# Patient Record
Sex: MALE | Race: WHITE | Employment: FULL TIME | URBAN - METROPOLITAN AREA
[De-identification: names, ages, dates, MRNs, and addresses within clinical notes are randomized per-mention and may not be internally consistent; named-entity substitution may affect disease eponyms.]

---

## 2017-10-09 ENCOUNTER — OFFICE VISIT (OUTPATIENT)
Dept: FAMILY MEDICINE CLINIC | Age: 42
End: 2017-10-09

## 2017-10-09 VITALS
BODY MASS INDEX: 34.9 KG/M2 | SYSTOLIC BLOOD PRESSURE: 128 MMHG | OXYGEN SATURATION: 98 % | DIASTOLIC BLOOD PRESSURE: 70 MMHG | HEART RATE: 78 BPM | WEIGHT: 216.2 LBS

## 2017-10-09 DIAGNOSIS — S80.12XA CONTUSION OF KNEE AND LOWER LEG, LEFT, INITIAL ENCOUNTER: Primary | ICD-10-CM

## 2017-10-09 DIAGNOSIS — M25.462 EFFUSION OF LEFT KNEE: ICD-10-CM

## 2017-10-09 DIAGNOSIS — S80.02XA CONTUSION OF KNEE AND LOWER LEG, LEFT, INITIAL ENCOUNTER: Primary | ICD-10-CM

## 2017-10-09 PROCEDURE — 99213 OFFICE O/P EST LOW 20 MIN: CPT | Performed by: FAMILY MEDICINE

## 2017-10-09 RX ORDER — ETODOLAC 400 MG/1
400 TABLET, FILM COATED ORAL 2 TIMES DAILY
Qty: 30 TABLET | Refills: 1 | Status: SHIPPED | OUTPATIENT
Start: 2017-10-09 | End: 2021-08-18

## 2017-10-09 ASSESSMENT — PATIENT HEALTH QUESTIONNAIRE - PHQ9
2. FEELING DOWN, DEPRESSED OR HOPELESS: 1
SUM OF ALL RESPONSES TO PHQ QUESTIONS 1-9: 1
1. LITTLE INTEREST OR PLEASURE IN DOING THINGS: 0
SUM OF ALL RESPONSES TO PHQ9 QUESTIONS 1 & 2: 1

## 2017-10-09 NOTE — PATIENT INSTRUCTIONS
[email or web address] and sign in to your [email or web address] account. Enter G230 in the KRAFT[place]ERK[place] box to learn more about \"Meniscus Tear: Care Instructions. \"     If you do not have an account, please click on the \"Sign Up Now\" link. Current as of: March 21, 2017  Content Version: 11.3  © 7067-2057 [employer]. Care instructions adapted under license by [facility] ([facility]). If you have questions about a medical condition or this instruction, always ask your healthcare professional. [address] any warranty or liability for your use of this information. Patient Education        Meniscus Tear: Exercises  Your Care Instructions  Here are some examples of typical rehabilitation exercises for your condition. Start each exercise slowly. Ease off the exercise if you start to have pain. Your doctor or physical therapist will tell you when you can start these exercises and which ones will work best for you. How to do the exercises  Calf wall stretch    1. Stand facing a wall with your hands on the wall at about eye level. Put your affected leg about a step behind your other leg. 2. Keeping your back leg straight and your back heel on the floor, bend your front knee and gently bring your hip and chest toward the wall until you feel a stretch in the calf of your back leg. 3. Hold the stretch for at least 15 to 30 seconds. 4. Repeat 2 to 4 times. 5. Repeat steps 1 through 4, but this time keep your back knee bent. Hamstring wall stretch    1. Lie on your back in a doorway, with your good leg through the open door. 2. Slide your affected leg up the wall to straighten your knee. You should feel a gentle stretch down the back of your leg. ¨ Do not arch your back. ¨ Do not bend either knee. ¨ Keep one heel touching the floor and the other heel touching the wall. Do not point your toes. 3. Hold the stretch for at least 1 minute.  Then over time, try to lengthen the time you hold the stretch to as long as 6 minutes. 4. Repeat 2 to 4 times. If you do not have a place to do this exercise in a doorway, there is another way to do it:  1. Lie on your back, and bend your affected leg. 2. Loop a towel under the ball and toes of that foot, and hold the ends of the towel in your hands. 3. Straighten your knee, and slowly pull back on the towel. You should feel a gentle stretch down the back of your leg. 4. Hold the stretch for at least 15 to 30 seconds. Or even better, hold the stretch for 1 minute if you can. 5. Repeat 2 to 4 times. Quad sets    1. Sit with your leg straight and supported on the floor or a firm bed. (If you feel discomfort in the front or back of your knee, place a small towel roll under your knee.)  2. Tighten the muscles on top of your thigh by pressing the back of your knee flat down to the floor. (If you feel discomfort under your kneecap, place a small towel roll under your knee.)  3. Hold for about 6 seconds, then rest up to 10 seconds. 4. Do 8 to 12 repetitions several times a day. Straight-leg raises to the front    1. Lie on your back with your good knee bent so that your foot rests flat on the floor. Your injured leg should be straight. Make sure that your low back has a normal curve. You should be able to slip your flat hand in between the floor and the small of your back, with your palm touching the floor and your back touching the back of your hand. 2. Tighten the thigh muscles in the injured leg by pressing the back of your knee flat down to the floor. Hold your knee straight. 3. Keeping the thigh muscles tight, lift your injured leg up so that your heel is about 12 inches off the floor. Hold for 5 seconds and then lower slowly. 4. Do 8 to 12 repetitions. Straight-leg raises to the back    1. Lie on your stomach, and lift your leg straight up behind you (toward the ceiling).   2. Lift your toes about 6 inches off the floor, hold for about 6 seconds, then lower slowly. 3. Do 8 to 12 repetitions. Hamstring curls    1. Lie on your stomach with your knees straight. If your kneecap is uncomfortable, roll up a washcloth and put it under your leg just above your kneecap. 2. Lift the foot of your injured leg by bending the knee so that you bring the foot up toward your buttock. If this motion hurts, try it without bending your knee quite as far. This may help you avoid any painful motion. 3. Slowly lower your leg back to the floor. 4. Do 8 to 12 repetitions. 5. With permission from your doctor or physical therapist, you may also want to add a cuff weight to your ankle (not more than 5 pounds). With weight, you do not have to lift your leg more than 12 inches to get a hamstring workout. Wall slide with ball squeeze    1. Stand with your back against a wall and with your feet about shoulder-width apart. Your feet should be about 12 inches away from the wall. 2. Put a ball about the size of a soccer ball between your knees. Then slowly slide down the wall until your knees are bent about 20 to 30 degrees. 3. Tighten your thigh muscles by squeezing the ball between your knees. Hold that position for about 10 seconds, then stop squeezing. Rest for up to 10 seconds between repetitions. 4. Repeat 8 to 12 times. Heel raises    1. Stand with your feet a few inches apart, with your hands lightly resting on a counter or chair in front of you. 2. Slowly raise your heels off the floor while keeping your knees straight. 3. Hold for about 6 seconds, then slowly lower your heels to the floor. 4. Do 8 to 12 repetitions several times during the day. Heel dig bridging    Note: Stop doing this exercise if it causes pain. 1. Lie on your back with both knees bent and your ankles bent so that only your heels are digging into the floor. Your knees should be bent about 90 degrees.   2. Then push your heels into the floor, squeeze your buttocks, and lift your hips off the floor until your shoulders, hips, and knees are all in a straight line. 3. Hold for about 6 seconds as you continue to breathe normally, and then slowly lower your hips back down to the floor and rest for up to 10 seconds. 4. Do 8 to 12 repetitions. Shallow standing knee bends    Note: Do this exercise only if you have very little pain; if you have no clicking, locking, or giving way in the injured knee; and if it does not hurt while you are doing 8 to 12 repetitions. 1. Stand with your hands lightly resting on a counter or chair in front of you. Put your feet shoulder-width apart. 2. Slowly bend your knees so that you squat down like you are going to sit in a chair. Make sure your knees do not go in front of your toes. 3. Lower yourself about 6 inches. Your heels should remain on the floor at all times. 4. Rise slowly to a standing position. Follow-up care is a key part of your treatment and safety. Be sure to make and go to all appointments, and call your doctor if you are having problems. It's also a good idea to know your test results and keep a list of the medicines you take. Where can you learn more? Go to [email or web address].[employer]. org and sign in to your [employer] account. Enter C183 in the Telera box to learn more about \"Meniscus Tear: Exercises. \"     If you do not have an account, please click on the \"Sign Up Now\" link. Current as of: March 21, 2017  Content Version: 11.3  © 2167-4040 [employer]. Care instructions adapted under license by [facility] ([facility]). If you have questions about a medical condition or this instruction, always ask your healthcare professional. [place] [address] any warranty or liability for your use of this information.
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
## 2017-10-22 ASSESSMENT — ENCOUNTER SYMPTOMS
COUGH: 0
ABDOMINAL PAIN: 0
BLURRED VISION: 0

## 2018-06-07 ENCOUNTER — OFFICE VISIT (OUTPATIENT)
Dept: FAMILY MEDICINE CLINIC | Age: 43
End: 2018-06-07

## 2018-06-07 VITALS
DIASTOLIC BLOOD PRESSURE: 82 MMHG | WEIGHT: 204.2 LBS | SYSTOLIC BLOOD PRESSURE: 132 MMHG | OXYGEN SATURATION: 97 % | BODY MASS INDEX: 32.96 KG/M2 | HEART RATE: 94 BPM

## 2018-06-07 DIAGNOSIS — F41.9 ANXIETY: Primary | ICD-10-CM

## 2018-06-07 DIAGNOSIS — F32.A DEPRESSION, UNSPECIFIED DEPRESSION TYPE: ICD-10-CM

## 2018-06-07 DIAGNOSIS — F51.01 PRIMARY INSOMNIA: ICD-10-CM

## 2018-06-07 PROCEDURE — 99213 OFFICE O/P EST LOW 20 MIN: CPT | Performed by: FAMILY MEDICINE

## 2018-06-07 RX ORDER — QUETIAPINE FUMARATE 50 MG/1
50 TABLET, FILM COATED ORAL 2 TIMES DAILY
Qty: 60 TABLET | Refills: 1 | Status: SHIPPED | OUTPATIENT
Start: 2018-06-07 | End: 2019-01-17

## 2018-06-24 ASSESSMENT — ENCOUNTER SYMPTOMS
COUGH: 0
BLURRED VISION: 0
ABDOMINAL PAIN: 0

## 2019-01-15 ENCOUNTER — OFFICE VISIT (OUTPATIENT)
Dept: FAMILY MEDICINE CLINIC | Age: 44
End: 2019-01-15
Payer: COMMERCIAL

## 2019-01-15 VITALS
DIASTOLIC BLOOD PRESSURE: 70 MMHG | RESPIRATION RATE: 16 BRPM | SYSTOLIC BLOOD PRESSURE: 124 MMHG | WEIGHT: 208 LBS | OXYGEN SATURATION: 99 % | TEMPERATURE: 97.5 F | HEART RATE: 82 BPM | BODY MASS INDEX: 33.43 KG/M2 | HEIGHT: 66 IN

## 2019-01-15 DIAGNOSIS — J40 BRONCHITIS: Primary | ICD-10-CM

## 2019-01-15 DIAGNOSIS — F17.200 CURRENT SMOKER: ICD-10-CM

## 2019-01-15 PROCEDURE — 99213 OFFICE O/P EST LOW 20 MIN: CPT | Performed by: FAMILY MEDICINE

## 2019-01-15 RX ORDER — AZITHROMYCIN 250 MG/1
TABLET, FILM COATED ORAL
Qty: 6 TABLET | Refills: 0 | Status: SHIPPED | OUTPATIENT
Start: 2019-01-15 | End: 2019-01-25

## 2019-01-15 RX ORDER — DEXTROMETHORPHAN HYDROBROMIDE AND PROMETHAZINE HYDROCHLORIDE 15; 6.25 MG/5ML; MG/5ML
5 SYRUP ORAL NIGHTLY PRN
Qty: 50 ML | Refills: 0 | Status: SHIPPED | OUTPATIENT
Start: 2019-01-15 | End: 2019-01-17

## 2019-01-15 RX ORDER — BENZONATATE 100 MG/1
CAPSULE ORAL
Qty: 30 CAPSULE | Refills: 0 | Status: SHIPPED | OUTPATIENT
Start: 2019-01-15 | End: 2020-12-21

## 2019-01-17 ENCOUNTER — TELEPHONE (OUTPATIENT)
Dept: FAMILY MEDICINE CLINIC | Age: 44
End: 2019-01-17

## 2019-01-17 ENCOUNTER — OFFICE VISIT (OUTPATIENT)
Dept: FAMILY MEDICINE CLINIC | Age: 44
End: 2019-01-17
Payer: COMMERCIAL

## 2019-01-17 VITALS
TEMPERATURE: 98.7 F | OXYGEN SATURATION: 94 % | BODY MASS INDEX: 32.1 KG/M2 | DIASTOLIC BLOOD PRESSURE: 70 MMHG | HEART RATE: 100 BPM | HEIGHT: 68 IN | SYSTOLIC BLOOD PRESSURE: 100 MMHG | WEIGHT: 211.8 LBS

## 2019-01-17 DIAGNOSIS — J40 BRONCHITIS: Primary | ICD-10-CM

## 2019-01-17 PROCEDURE — 99213 OFFICE O/P EST LOW 20 MIN: CPT | Performed by: NURSE PRACTITIONER

## 2019-01-17 RX ORDER — PREDNISONE 20 MG/1
40 TABLET ORAL DAILY
Qty: 10 TABLET | Refills: 0 | Status: SHIPPED | OUTPATIENT
Start: 2019-01-17 | End: 2019-01-22

## 2019-01-17 RX ORDER — GUAIFENESIN 600 MG/1
600 TABLET, EXTENDED RELEASE ORAL 2 TIMES DAILY
Qty: 30 TABLET | Refills: 0 | Status: SHIPPED | OUTPATIENT
Start: 2019-01-17 | End: 2020-12-21

## 2019-01-17 ASSESSMENT — ENCOUNTER SYMPTOMS
SORE THROAT: 1
SINUS PRESSURE: 1
DIARRHEA: 0
SINUS PAIN: 1
RHINORRHEA: 1
CHEST TIGHTNESS: 1
WHEEZING: 0
COUGH: 1
NAUSEA: 0
VOMITING: 0
HEMOPTYSIS: 0
SHORTNESS OF BREATH: 0

## 2019-02-08 ENCOUNTER — OFFICE VISIT (OUTPATIENT)
Dept: FAMILY MEDICINE CLINIC | Age: 44
End: 2019-02-08
Payer: COMMERCIAL

## 2019-02-08 ENCOUNTER — TELEPHONE (OUTPATIENT)
Dept: FAMILY MEDICINE CLINIC | Age: 44
End: 2019-02-08

## 2019-02-08 ENCOUNTER — HOSPITAL ENCOUNTER (OUTPATIENT)
Dept: GENERAL RADIOLOGY | Age: 44
End: 2019-02-08
Payer: COMMERCIAL

## 2019-02-08 ENCOUNTER — HOSPITAL ENCOUNTER (OUTPATIENT)
Dept: GENERAL RADIOLOGY | Age: 44
Discharge: HOME | End: 2019-02-08
Payer: COMMERCIAL

## 2019-02-08 ENCOUNTER — HOSPITAL ENCOUNTER (OUTPATIENT)
Age: 44
Discharge: HOME | End: 2019-02-08
Payer: COMMERCIAL

## 2019-02-08 VITALS
HEART RATE: 93 BPM | OXYGEN SATURATION: 89 % | SYSTOLIC BLOOD PRESSURE: 120 MMHG | TEMPERATURE: 99.3 F | BODY MASS INDEX: 31.85 KG/M2 | WEIGHT: 206.4 LBS | DIASTOLIC BLOOD PRESSURE: 72 MMHG

## 2019-02-08 DIAGNOSIS — R09.02 HYPOXIA: ICD-10-CM

## 2019-02-08 DIAGNOSIS — R05.9 COUGH: Primary | ICD-10-CM

## 2019-02-08 DIAGNOSIS — R05.9 COUGH: ICD-10-CM

## 2019-02-08 DIAGNOSIS — R53.83 OTHER FATIGUE: ICD-10-CM

## 2019-02-08 DIAGNOSIS — J20.8 ACUTE BRONCHITIS DUE TO OTHER SPECIFIED ORGANISMS: ICD-10-CM

## 2019-02-08 LAB
ALBUMIN SERPL-MCNC: 4.4 GM/DL (ref 3.4–5)
ALP BLD-CCNC: 78 IU/L (ref 40–128)
ALT SERPL-CCNC: 50 U/L (ref 10–40)
ANION GAP SERPL CALCULATED.3IONS-SCNC: 10 MMOL/L (ref 4–16)
AST SERPL-CCNC: 52 IU/L (ref 15–37)
ATYPICAL LYMPHOCYTE ABSOLUTE COUNT: ABNORMAL
BASOPHILS ABSOLUTE: 0 K/CU MM
BASOPHILS RELATIVE PERCENT: 0.7 % (ref 0–1)
BILIRUB SERPL-MCNC: 0.3 MG/DL (ref 0–1)
BUN BLDV-MCNC: 8 MG/DL (ref 6–23)
CALCIUM SERPL-MCNC: 8.9 MG/DL (ref 8.3–10.6)
CHLORIDE BLD-SCNC: 96 MMOL/L (ref 99–110)
CHOLESTEROL: 121 MG/DL
CO2: 31 MMOL/L (ref 21–32)
CREAT SERPL-MCNC: 0.8 MG/DL (ref 0.9–1.3)
DIFFERENTIAL TYPE: ABNORMAL
EOSINOPHILS ABSOLUTE: 0 K/CU MM
EOSINOPHILS RELATIVE PERCENT: 0.4 % (ref 0–3)
GFR AFRICAN AMERICAN: >60 ML/MIN/1.73M2
GFR NON-AFRICAN AMERICAN: >60 ML/MIN/1.73M2
GLUCOSE FASTING: 76 MG/DL (ref 70–99)
HBA1C MFR BLD: 5.6 %
HCT VFR BLD CALC: 50.8 % (ref 42–52)
HDLC SERPL-MCNC: 59 MG/DL
HEMOGLOBIN: 16.8 GM/DL (ref 13.5–18)
IMMATURE NEUTROPHIL %: 0.4 % (ref 0–0.43)
LDL CHOLESTEROL DIRECT: 51 MG/DL
LYMPHOCYTES ABSOLUTE: 1.2 K/CU MM
LYMPHOCYTES RELATIVE PERCENT: 44.9 % (ref 24–44)
MCH RBC QN AUTO: 32.2 PG (ref 27–31)
MCHC RBC AUTO-ENTMCNC: 33.1 % (ref 32–36)
MCV RBC AUTO: 97.3 FL (ref 78–100)
MONOCYTES ABSOLUTE: 0.3 K/CU MM
MONOCYTES RELATIVE PERCENT: 11.6 % (ref 0–4)
PDW BLD-RTO: 12.2 % (ref 11.7–14.9)
PLATELET # BLD: 169 K/CU MM (ref 140–440)
PLT MORPHOLOGY: ABNORMAL
PMV BLD AUTO: 10.2 FL (ref 7.5–11.1)
POTASSIUM SERPL-SCNC: 4.2 MMOL/L (ref 3.5–5.1)
RBC # BLD: 5.22 M/CU MM (ref 4.6–6.2)
RBC # BLD: ABNORMAL 10*6/UL
SEGMENTED NEUTROPHILS ABSOLUTE COUNT: 1.1 K/CU MM
SEGMENTED NEUTROPHILS RELATIVE PERCENT: 42 % (ref 36–66)
SODIUM BLD-SCNC: 137 MMOL/L (ref 135–145)
T4 FREE: 1.2 NG/DL (ref 0.9–1.8)
TOTAL IMMATURE NEUTOROPHIL: 0.01 K/CU MM
TOTAL PROTEIN: 6.8 GM/DL (ref 6.4–8.2)
TRIGL SERPL-MCNC: 81 MG/DL
TSH HIGH SENSITIVITY: 1.36 UIU/ML (ref 0.27–4.2)
WBC # BLD: 2.7 K/CU MM (ref 4–10.5)

## 2019-02-08 PROCEDURE — 84443 ASSAY THYROID STIM HORMONE: CPT

## 2019-02-08 PROCEDURE — 83036 HEMOGLOBIN GLYCOSYLATED A1C: CPT | Performed by: FAMILY MEDICINE

## 2019-02-08 PROCEDURE — 83721 ASSAY OF BLOOD LIPOPROTEIN: CPT

## 2019-02-08 PROCEDURE — 71046 X-RAY EXAM CHEST 2 VIEWS: CPT

## 2019-02-08 PROCEDURE — 85025 COMPLETE CBC W/AUTO DIFF WBC: CPT

## 2019-02-08 PROCEDURE — 80061 LIPID PANEL: CPT

## 2019-02-08 PROCEDURE — 80053 COMPREHEN METABOLIC PANEL: CPT

## 2019-02-08 PROCEDURE — 36415 COLL VENOUS BLD VENIPUNCTURE: CPT

## 2019-02-08 PROCEDURE — 84439 ASSAY OF FREE THYROXINE: CPT

## 2019-02-08 PROCEDURE — 99214 OFFICE O/P EST MOD 30 MIN: CPT | Performed by: FAMILY MEDICINE

## 2019-02-08 RX ORDER — LEVOFLOXACIN 500 MG/1
500 TABLET, FILM COATED ORAL DAILY
Qty: 5 TABLET | Refills: 0 | Status: SHIPPED | OUTPATIENT
Start: 2019-02-08 | End: 2019-02-15

## 2019-02-08 RX ORDER — METHYLPREDNISOLONE 4 MG/1
TABLET ORAL
Qty: 1 KIT | Refills: 0 | Status: SHIPPED | OUTPATIENT
Start: 2019-02-08 | End: 2020-12-21

## 2019-02-08 ASSESSMENT — ENCOUNTER SYMPTOMS: SHORTNESS OF BREATH: 1

## 2019-02-09 ENCOUNTER — TELEPHONE (OUTPATIENT)
Dept: FAMILY MEDICINE CLINIC | Age: 44
End: 2019-02-09

## 2019-02-09 ASSESSMENT — ENCOUNTER SYMPTOMS
SINUS PRESSURE: 1
SINUS PAIN: 1
DIARRHEA: 0
ABDOMINAL PAIN: 0
RHINORRHEA: 0
VOMITING: 0
NAUSEA: 0
CONSTIPATION: 0

## 2020-12-21 ENCOUNTER — OFFICE VISIT (OUTPATIENT)
Dept: FAMILY MEDICINE CLINIC | Age: 45
End: 2020-12-21
Payer: COMMERCIAL

## 2020-12-21 ENCOUNTER — HOSPITAL ENCOUNTER (OUTPATIENT)
Dept: GENERAL RADIOLOGY | Age: 45
Discharge: HOME | End: 2020-12-21
Payer: COMMERCIAL

## 2020-12-21 ENCOUNTER — HOSPITAL ENCOUNTER (OUTPATIENT)
Age: 45
Discharge: HOME | End: 2020-12-21
Payer: COMMERCIAL

## 2020-12-21 VITALS
OXYGEN SATURATION: 96 % | BODY MASS INDEX: 31.39 KG/M2 | SYSTOLIC BLOOD PRESSURE: 125 MMHG | HEART RATE: 87 BPM | HEIGHT: 67 IN | WEIGHT: 200 LBS | DIASTOLIC BLOOD PRESSURE: 80 MMHG

## 2020-12-21 DIAGNOSIS — F10.20 ALCOHOLIC (HCC): ICD-10-CM

## 2020-12-21 DIAGNOSIS — R10.32 LEFT GROIN PAIN: Primary | ICD-10-CM

## 2020-12-21 DIAGNOSIS — F17.200 TOBACCO DEPENDENCE: ICD-10-CM

## 2020-12-21 PROCEDURE — 99214 OFFICE O/P EST MOD 30 MIN: CPT | Performed by: FAMILY MEDICINE

## 2020-12-21 PROCEDURE — 73502 X-RAY EXAM HIP UNI 2-3 VIEWS: CPT

## 2020-12-21 ASSESSMENT — PATIENT HEALTH QUESTIONNAIRE - PHQ9
1. LITTLE INTEREST OR PLEASURE IN DOING THINGS: 0
SUM OF ALL RESPONSES TO PHQ QUESTIONS 1-9: 0
SUM OF ALL RESPONSES TO PHQ QUESTIONS 1-9: 0

## 2020-12-21 NOTE — Clinical Note
Can you call him? The next step if he still has groin pain is to see general surgery, we can try Dr. Skip Blake up here if he would like.   Let me know and I will generate referral

## 2020-12-21 NOTE — PROGRESS NOTES
Chief Complaint   Patient presents with    Joint Pain     Patient states having pain at the groin area on the left side. Patient also states at time the pain will go down his left leg and into his hip    Nicotine Dependence     current heavy smoker       [place] NARRATIVE:    As above, not seen here in over a year, hx of recurrent LR illnesses. No injury or fall, sudden onset of left groin pain. Worse with caffeine and sitting, better with moving around a bit. Drinks hard liquor daily, and when he drinks at night the pain goes away. Otherwise constant. H/o severe groin strain as youth and also admits to years of heavy alcohol use, but presently trying to cut back from multiple shots of hard liqour almost every day for stress and depression. Also reports foot pain at night, from standing on hard floors all day. Patient is established unless otherwise noted. Above chief complaint and [place] obtained by this physician provider. Review of Systems   Constitutional: Negative for activity change, chills, fatigue and fever. HENT: Negative for congestion. Respiratory: Negative for cough, chest tightness, shortness of breath and wheezing. Cardiovascular: Negative for chest pain and leg swelling. Gastrointestinal: Negative for abdominal pain. Musculoskeletal: Positive for arthralgias (left groin pain and bilateral foot pain at night) and gait problem. Negative for back pain and myalgias. Skin: Negative for rash. Psychiatric/Behavioral: Negative for behavioral problems and dysphoric mood. No Known Allergies  Allergy historyupdated. No outpatient medications have been marked as taking for the 12/21/20 encounter (Office Visit) with Larry Kendall MD.       Tobacco use history updated.    Social History     Tobacco Use   Smoking Status Current Every Day Smoker    Packs/day: 1.00    Years: 25.00    Pack years: 25.00    Types: Cigarettes   Smokeless Tobacco Never Used        Nursing note reviewed. Vitals:    12/21/20 1332   BP: 125/80   Site: Left Upper Arm   Position: Sitting   Cuff Size: Large Adult   Pulse: 87   SpO2: 96%   Weight: 200 lb (90.7 kg)   Height: 5' 7\" (1.702 m)          BP Readings from Last 3 Encounters:   12/21/20 125/80   02/08/19 120/72   01/17/19 100/70     Wt Readings from Last 3 Encounters:   12/21/20 200 lb (90.7 kg)   02/08/19 206 lb 6.4 oz (93.6 kg)   01/17/19 211 lb 12.8 oz (96.1 kg)     Body mass index is 31.32 kg/m². No results found for this visit on 12/21/20. Physical Exam  Vitals signs and nursing note reviewed. Constitutional:       General: He is not in acute distress. Appearance: He is well-developed. He is not diaphoretic. HENT:      Head: Normocephalic and atraumatic. Eyes:      Conjunctiva/sclera: Conjunctivae normal.      Pupils: Pupils are equal, round, and reactive to light. Cardiovascular:      Rate and Rhythm: Normal rate and regular rhythm. Heart sounds: Normal heart sounds. No murmur. No friction rub. Pulmonary:      Effort: Pulmonary effort is normal. No respiratory distress. Breath sounds: Normal breath sounds. No wheezing. Abdominal:      General: Abdomen is flat. Tenderness: There is abdominal tenderness (to anterior left hip and groin, with palpation). Musculoskeletal:         General: Tenderness present. Comments: Somewhat limited hip internal and external rotation. Hop flexion is fair. Skin:     General: Skin is warm and dry. Neurological:      Mental Status: He is alert and oriented to person, place, and time. _________________________________________________  Assessment:     Ariel Ramirez was seen today for joint pain and nicotine dependence. Diagnoses and all orders for this visit:    Left groin pain  -     XR HIP 2-3 VW W PELVIS LEFT; Future    Tobacco dependence    Alcoholic ([address]      Problems listed above are stable except as follows: we will start with plain films of hip.   I am worried about arthritis vs femoral head issues given alcohol history. He is supported in his intent to reduce alcohol use, gradually is ok. He should pay careful attention to healthy nutrition. Also reduced smoking would be good. Therapeutic plan is unchanged unless otherwise specified. See orders above and comments below for details of workup or medication orders. _________________________________________________  Plan:     TO gen surg if hip xray neg, to ortho if hip xray abnormal.      Return if symptoms worsen or fail to improve.       Electronically signed by Frederick Pérez MD on 12/21/20 at 2:02 PM EST

## 2020-12-22 ENCOUNTER — TELEPHONE (OUTPATIENT)
Dept: FAMILY MEDICINE CLINIC | Age: 45
End: 2020-12-22

## 2020-12-23 ENCOUNTER — TELEPHONE (OUTPATIENT)
Dept: FAMILY MEDICINE CLINIC | Age: 45
End: 2020-12-23

## 2020-12-23 RX ORDER — CYCLOBENZAPRINE HCL 10 MG
10 TABLET ORAL NIGHTLY PRN
Qty: 30 TABLET | Refills: 0 | Status: SHIPPED | OUTPATIENT
Start: 2020-12-23 | End: 2021-01-02

## 2020-12-23 RX ORDER — PREDNISONE 20 MG/1
20 TABLET ORAL 2 TIMES DAILY
Qty: 10 TABLET | Refills: 0 | Status: SHIPPED | OUTPATIENT
Start: 2020-12-23 | End: 2020-12-28

## 2020-12-23 NOTE — TELEPHONE ENCOUNTER
So I sent a round of prednisone and also a muscle relaxant to [facility] in [place] and [facility]. He should try those medications and limit activity that is very painful. Give it maybe two weeks to see if having improvement. If not we should have a phone or video f-u because he might need to see ortho to see if hip sprain or maybe some PT or even consider a nerve test to see if has nerve injury.

## 2020-12-23 NOTE — TELEPHONE ENCOUNTER
----- Message from Love Houser MA sent at 12/22/2020  4:24 PM EST -----  Patient advised. Patient states that since the XRay is normal, wants to know what is the next step.

## 2021-01-03 ASSESSMENT — ENCOUNTER SYMPTOMS
CHEST TIGHTNESS: 0
ABDOMINAL PAIN: 0
SHORTNESS OF BREATH: 0
BACK PAIN: 0
COUGH: 0
WHEEZING: 0

## 2021-01-04 NOTE — PROGRESS NOTES
Spoke with pt who states he is going to give it a week and see how he feels and call back on Friday.

## 2021-01-21 ENCOUNTER — TELEPHONE (OUTPATIENT)
Dept: FAMILY MEDICINE CLINIC | Age: 46
End: 2021-01-21

## 2021-01-21 NOTE — TELEPHONE ENCOUNTER
----- Message from Branden Hays MD sent at 1/3/2021 12:21 PM EST -----  Can you call him? The next step if he still has groin pain is to see general surgery, we can try Dr. Ramos Comment up here if he would like.   Let me know and I will generate referral

## 2021-08-18 ENCOUNTER — OFFICE VISIT (OUTPATIENT)
Dept: FAMILY MEDICINE CLINIC | Age: 46
End: 2021-08-18
Payer: COMMERCIAL

## 2021-08-18 VITALS
OXYGEN SATURATION: 94 % | SYSTOLIC BLOOD PRESSURE: 138 MMHG | DIASTOLIC BLOOD PRESSURE: 90 MMHG | WEIGHT: 195 LBS | BODY MASS INDEX: 30.54 KG/M2 | HEART RATE: 87 BPM

## 2021-08-18 DIAGNOSIS — M77.42 METATARSALGIA OF LEFT FOOT: ICD-10-CM

## 2021-08-18 DIAGNOSIS — R10.13 EPIGASTRIC CRAMPING: ICD-10-CM

## 2021-08-18 DIAGNOSIS — R06.02 SHORTNESS OF BREATH: ICD-10-CM

## 2021-08-18 DIAGNOSIS — R13.10 DYSPHAGIA, UNSPECIFIED TYPE: Primary | ICD-10-CM

## 2021-08-18 DIAGNOSIS — R07.89 ATYPICAL CHEST PAIN: ICD-10-CM

## 2021-08-18 DIAGNOSIS — F10.20 ALCOHOL USE DISORDER, MODERATE, DEPENDENCE (HCC): ICD-10-CM

## 2021-08-18 PROCEDURE — 99214 OFFICE O/P EST MOD 30 MIN: CPT | Performed by: FAMILY MEDICINE

## 2021-08-18 RX ORDER — OMEPRAZOLE 40 MG/1
40 CAPSULE, DELAYED RELEASE ORAL
Qty: 30 CAPSULE | Refills: 5 | Status: SHIPPED | OUTPATIENT
Start: 2021-08-18 | End: 2022-07-18

## 2021-08-18 ASSESSMENT — PATIENT HEALTH QUESTIONNAIRE - PHQ9
SUM OF ALL RESPONSES TO PHQ QUESTIONS 1-9: 0
2. FEELING DOWN, DEPRESSED OR HOPELESS: 0
SUM OF ALL RESPONSES TO PHQ QUESTIONS 1-9: 0
SUM OF ALL RESPONSES TO PHQ QUESTIONS 1-9: 0
1. LITTLE INTEREST OR PLEASURE IN DOING THINGS: 0
SUM OF ALL RESPONSES TO PHQ9 QUESTIONS 1 & 2: 0

## 2021-08-18 NOTE — PROGRESS NOTES
Chief Complaint   Patient presents with    Chest Pain     pt reports discomfort in chest and trouble swallowing. pt thinks he is having episodes of apnea while sleeping     Dysphagia     Swallowing difficulty as above    Hypertension     Elevated blood pressure without diagnosis of hypertension    Nicotine Dependence     Current smoker    Abdominal Pain     epigastric discomfort like a rolling sensation in upper abdomen but then short of breath (lkike stops breathing for a second)--and anxious,        [place] NARRATIVE:    For several days has felt like something stuck in the back of his throat , just above right collarbone. No actual choking. Sx have come and fgone but are actually better today. He also reports some burning in upper chest with coffee. Is taking ibuprofen 3-4 daily for foot pain. He reports a rolling sensation not quite pain in his epigastrium. No nausea or vomiting. He does have anxiety at times however admits to drinking a fifth of liquor every 2-3 days. He also smokes a pack of cigarettes every day. Blood pressure is elevated as above. Weight is down very slightly. He admits to stress and is currently laid off. He is seeing a podiatrist for pain on the bottom of his foot. He actually has a callus under his fourth metatarsal head consistent with metatarsalgia. He already has shoe inserts and is working with a podiatrist to reduce that pain but must decrease his NSAID use as is related to current symptomatology. Patient is established unless otherwise noted. Above chief complaint and [place] obtained by this physician provider. Review of Systems   Constitutional: Negative for activity change, chills, fatigue and fever. HENT: Negative for congestion. Patient reporting neck pain above the level of the collarbones worse on the right   Respiratory: Negative for cough, chest tightness, shortness of breath and wheezing.          Patient reporting burning in upper chest when drinking coffee   Cardiovascular: Negative for chest pain and leg swelling. Gastrointestinal: Positive for abdominal pain (Patient reporting epigastric pain with a rolling sensation, without nausea or vomiting but with anxiety). Musculoskeletal: Negative for back pain and myalgias. Skin: Negative for rash. Psychiatric/Behavioral: Negative for behavioral problems and dysphoric mood. No Known Allergies  Allergy historyupdated. Outpatient Medications Marked as Taking for the 8/18/21 encounter (Office Visit) with Mathew Zelaya MD   Medication Sig Dispense Refill    omeprazole (PRILOSEC) 40 MG delayed release capsule Take 1 capsule by mouth every morning (before breakfast) 30 capsule 5       Tobacco use history updated. Social History     Tobacco Use   Smoking Status Current Every Day Smoker    Packs/day: 1.00    Years: 25.00    Pack years: 25.00    Types: Cigarettes   Smokeless Tobacco Never Used        Nursing note reviewed. Vitals:    08/18/21 1311   BP: (!) 138/90   Site: Left Upper Arm   Position: Sitting   Cuff Size: Medium Adult   Pulse: 87   SpO2: 94%   Weight: 195 lb (88.5 kg)          BP Readings from Last 3 Encounters:   08/18/21 (!) 138/90   12/21/20 125/80   02/08/19 120/72     Wt Readings from Last 3 Encounters:   08/18/21 195 lb (88.5 kg)   12/21/20 200 lb (90.7 kg)   02/08/19 206 lb 6.4 oz (93.6 kg)     Body mass index is 30.54 kg/m². No results found for this visit on 08/18/21. Physical Exam  Vitals and nursing note reviewed. Constitutional:       General: He is not in acute distress. Appearance: He is well-developed. He is not diaphoretic. HENT:      Head: Normocephalic and atraumatic. Right Ear: External ear normal.      Left Ear: External ear normal.      Nose: Nose normal.      Mouth/Throat:      Pharynx: No oropharyngeal exudate. Eyes:      General:         Right eye: No discharge. Left eye: No discharge.       Conjunctiva/sclera: Conjunctivae normal.      Pupils: Pupils are equal, round, and reactive to light. Neck:      Thyroid: No thyromegaly. Cardiovascular:      Rate and Rhythm: Normal rate and regular rhythm. Heart sounds: Normal heart sounds. No murmur heard. No gallop. Pulmonary:      Effort: Pulmonary effort is normal. No respiratory distress. Breath sounds: Rhonchi (widely distributed rhonchi and wheeze) present. No wheezing. Musculoskeletal:         General: Normal range of motion. Cervical back: Normal range of motion and neck supple. Lymphadenopathy:      Cervical: No cervical adenopathy. Skin:     General: Skin is warm and dry. Neurological:      Mental Status: He is alert and oriented to person, place, and time. Psychiatric:         Behavior: Behavior normal.           _________________________________________________  Assessment:     1. Dysphagia, unspecified type  2. Atypical chest pain  -     XR CHEST STANDARD (2 VW); Future  -     omeprazole (PRILOSEC) 40 MG delayed release capsule; Take 1 capsule by mouth every morning (before breakfast), Disp-30 capsule, R-5Normal  3. Shortness of breath  -     XR CHEST STANDARD (2 VW); Future  4. Epigastric cramping  -     omeprazole (PRILOSEC) 40 MG delayed release capsule; Take 1 capsule by mouth every morning (before breakfast), Disp-30 capsule, R-5Normal  5. Alcohol use disorder, moderate, dependence ([address]  6. Metatarsalgia of left foot    At least some of his symptoms are related to high NSAID use and likely reflux worsened by alcohol. He was started on Prilosec 40 and advised to discontinue all ibuprofen if possible and substitute instead with Tylenol. Due to abnormal lung sounds without complaint of cough or congestion we will check a chest x-ray. He is strongly advised to reduce alcohol use if at all possible but to cut by half within the next week. He also has background history of cardiomyopathy which was when he was younger.   We may need

## 2021-08-18 NOTE — PATIENT INSTRUCTIONS
STOP IBUPROFEN and substitute tylenol 2 regular strength twice a day. Use omeprazole. Reduce alcohol use by at least half due to worse risks of everything.

## 2021-08-19 ASSESSMENT — ENCOUNTER SYMPTOMS
WHEEZING: 0
SHORTNESS OF BREATH: 0
COUGH: 0
ABDOMINAL PAIN: 1
CHEST TIGHTNESS: 0
BACK PAIN: 0

## 2021-10-25 ENCOUNTER — TELEPHONE (OUTPATIENT)
Dept: FAMILY MEDICINE CLINIC | Age: 46
End: 2021-10-25

## 2021-10-25 DIAGNOSIS — F17.200 TOBACCO DEPENDENCE: ICD-10-CM

## 2021-10-25 DIAGNOSIS — F10.20 ALCOHOL USE DISORDER, MODERATE, DEPENDENCE (HCC): ICD-10-CM

## 2021-10-25 DIAGNOSIS — R13.10 DYSPHAGIA, UNSPECIFIED TYPE: Primary | ICD-10-CM

## 2021-10-25 DIAGNOSIS — R07.89 ATYPICAL CHEST PAIN: ICD-10-CM

## 2021-10-25 DIAGNOSIS — R10.13 EPIGASTRIC CRAMPING: ICD-10-CM

## 2021-10-25 NOTE — TELEPHONE ENCOUNTER
Pt was here for an appt in august for abd pain, pt is still having a lot of issues.  He stated that at the appt pcp mentioned maybe sending him to GI pt is asking that since he is still having issues can pcp send referral, or does pt have to be seen again, please advise

## 2021-10-28 ENCOUNTER — TELEPHONE (OUTPATIENT)
Dept: GASTROENTEROLOGY | Age: 46
End: 2021-10-28

## 2021-11-04 ENCOUNTER — TELEPHONE (OUTPATIENT)
Dept: GASTROENTEROLOGY | Age: 46
End: 2021-11-04

## 2021-11-05 ENCOUNTER — TELEPHONE (OUTPATIENT)
Dept: GASTROENTEROLOGY | Age: 46
End: 2021-11-05

## 2021-11-05 NOTE — TELEPHONE ENCOUNTER
Pt. Returning call to be scheduled from a referral for dysphagia and abd pain.  Made appt for 11/22/21 @3:30PM

## 2021-11-22 ENCOUNTER — OFFICE VISIT (OUTPATIENT)
Dept: GASTROENTEROLOGY | Age: 46
End: 2021-11-22
Payer: COMMERCIAL

## 2021-11-22 VITALS
HEART RATE: 90 BPM | BODY MASS INDEX: 31.14 KG/M2 | SYSTOLIC BLOOD PRESSURE: 150 MMHG | RESPIRATION RATE: 16 BRPM | DIASTOLIC BLOOD PRESSURE: 94 MMHG | WEIGHT: 198.4 LBS | OXYGEN SATURATION: 96 % | TEMPERATURE: 98.5 F | HEIGHT: 67 IN

## 2021-11-22 DIAGNOSIS — R13.19 ESOPHAGEAL DYSPHAGIA: ICD-10-CM

## 2021-11-22 DIAGNOSIS — R10.13 EPIGASTRIC PAIN: Primary | ICD-10-CM

## 2021-11-22 DIAGNOSIS — Z01.818 PREOP TESTING: ICD-10-CM

## 2021-11-22 DIAGNOSIS — R14.0 BLOATING: ICD-10-CM

## 2021-11-22 DIAGNOSIS — Z12.11 ENCOUNTER FOR SCREENING COLONOSCOPY: ICD-10-CM

## 2021-11-22 PROCEDURE — 99204 OFFICE O/P NEW MOD 45 MIN: CPT | Performed by: NURSE PRACTITIONER

## 2021-11-22 RX ORDER — POLYETHYLENE GLYCOL 3350 17 G/17G
238 POWDER, FOR SOLUTION ORAL ONCE
Qty: 238 G | Refills: 0 | Status: SHIPPED | OUTPATIENT
Start: 2021-11-22 | End: 2021-11-22

## 2021-11-22 RX ORDER — BISACODYL 5 MG
TABLET, DELAYED RELEASE (ENTERIC COATED) ORAL
Qty: 4 TABLET | Refills: 0 | Status: SHIPPED | OUTPATIENT
Start: 2021-11-22 | End: 2022-02-14 | Stop reason: ALTCHOICE

## 2021-11-22 ASSESSMENT — ENCOUNTER SYMPTOMS
NAUSEA: 0
EYE PAIN: 0
BACK PAIN: 1
ABDOMINAL PAIN: 1
BLOOD IN STOOL: 0
SHORTNESS OF BREATH: 0
EYE DISCHARGE: 0
CONSTIPATION: 0
DIARRHEA: 0
VOMITING: 0
COUGH: 1
WHEEZING: 0
COLOR CHANGE: 0

## 2021-11-22 NOTE — PROGRESS NOTES
Abelino Cowan 51 y.o. male was seen by DOMINIK Barron on 11/22/2021     Wt Readings from Last 3 Encounters:   11/22/21 198 lb 6.4 oz (90 kg)   08/18/21 195 lb (88.5 kg)   12/21/20 200 lb (90.7 kg)       YOUNG Lopez is a pleasant 51 y.o.  male who presents today with his wife for abdominal pain, bloating, dysphagia, and encounter for screening colonoscopy. He has a past medical history of alcohol abuse, cardiomyopathy, obesity and low back pain. He drinks a fifth of whiskey every two days for the last three years. Infrequent use of Ibuprofen 600 mg for headaches. He smokes a pack of cigarettes per day. Per patient record he had a EGD done over twenty years ago with uncertain results. He has never had a colonoscopy. His appetite is good without early satiety. Dairy upsets his stomach. His weight is stable. No nausea or vomiting. His epigastric pain started a few months ago described as a 4/10 pressure like pain to left upper quadrant with bloating. His pain improves with drinking alcohol. His pain is worse after eating a heavy meal, eating vegetables and salads. He mentioned since starting Prilosec 40 mg in last two and a half weeks has worsened his bloating. Intermittent heartburn and acid reflux after eating certain foods or eats late at night. He initially felt a sensation that something was stuck in the right side of his throat but this improved with taking Prilosec. No nocturnal awakenings with acid reflux. No current dysphagia or pain with swallowing. No excess belching. He has excess flatulence. He denies changes in his bowel pattern. His typical bowel pattern is daily with soft brown formed stools. No diarrhea or constipation. No blood in his stools or melena. No family history of stomach or colon cancer. ROS  Review of Systems   Constitutional: Negative for appetite change, chills, diaphoresis, fatigue, fever and unexpected weight change.    HENT: Negative for ear pain and hearing loss. Eyes: Negative for pain, discharge and visual disturbance. Respiratory: Positive for cough. Negative for shortness of breath and wheezing. Cardiovascular: Negative for chest pain, palpitations and leg swelling. Gastrointestinal: Positive for abdominal pain. Negative for blood in stool, constipation, diarrhea, nausea and vomiting. Endocrine: Negative for cold intolerance and heat intolerance. Genitourinary: Negative for dysuria, frequency, hematuria and urgency. Musculoskeletal: Positive for back pain, myalgias and neck pain. Skin: Negative for color change, pallor and rash. Allergic/Immunologic: Negative for environmental allergies and food allergies. Neurological: Negative for dizziness, seizures, weakness and headaches. Hematological: Does not bruise/bleed easily. Psychiatric/Behavioral: Positive for dysphoric mood and sleep disturbance. Negative for suicidal ideas. The patient is nervous/anxious. Allergies  No Known Allergies    Medications  Current Outpatient Medications   Medication Sig Dispense Refill    omeprazole (PRILOSEC) 40 MG delayed release capsule Take 1 capsule by mouth every morning (before breakfast) 30 capsule 5     No current facility-administered medications for this visit. Past medical history:   He has a past medical history of Cardiomyopathy ([address] and Low back pain. Past surgical history:  He has no past surgical history on file. Social History:  He reports that he has been smoking cigarettes. He started smoking about 31 years ago. He has a 25.00 pack-year smoking history. He has never used smokeless tobacco. He reports current alcohol use of about 2.0 standard drinks of alcohol per week. He reports that he does not use drugs. Family history:  His family history is not on file.     Objective    Vitals:    11/22/21 1553   BP: (!) 150/94   Pulse:    Resp:    Temp:    SpO2:         Physical exam    Physical Exam  Constitutional:       General: He is not in acute distress. Appearance: He is well-developed. He is obese. He is not ill-appearing, toxic-appearing or diaphoretic. HENT:      Head: Normocephalic and atraumatic. Nose: Nose normal.      Mouth/Throat:      Mouth: Mucous membranes are moist.   Cardiovascular:      Rate and Rhythm: Normal rate and regular rhythm. Pulses: Normal pulses. Heart sounds: Normal heart sounds. No murmur heard. No gallop. Pulmonary:      Effort: Pulmonary effort is normal. No respiratory distress. Breath sounds: Normal breath sounds. No stridor. No wheezing or rhonchi. Abdominal:      General: Bowel sounds are normal. There is no distension. Palpations: Abdomen is soft. There is no mass. Tenderness: There is abdominal tenderness. Hernia: No hernia is present. Musculoskeletal:         General: Normal range of motion. Cervical back: Neck supple. Skin:     General: Skin is warm and dry. Neurological:      Mental Status: He is alert and oriented to person, place, and time. Psychiatric:         Mood and Affect: Mood normal.         No visits with results within 2 Month(s) from this visit.    Latest known visit with results is:   Hospital Outpatient Visit on 02/08/2019   Component Date Value Ref Range Status    WBC 02/08/2019 2.7* 4.0 - 10.5 K/CU MM Final    RBC 02/08/2019 5.22  4.6 - 6.2 M/CU MM Final    Hemoglobin 02/08/2019 16.8  13.5 - 18.0 GM/DL Final    Hematocrit 02/08/2019 50.8  42 - 52 % Final    MCV 02/08/2019 97.3  78 - 100 FL Final    MCH 02/08/2019 32.2* 27 - 31 PG Final    MCHC 02/08/2019 33.1  32.0 - 36.0 % Final    RDW 02/08/2019 12.2  11.7 - 14.9 % Final    Platelets 19/51/6925 169  140 - 440 K/CU MM Final    MPV 02/08/2019 10.2  7.5 - 11.1 FL Final    Immature Neutrophil % 02/08/2019 0.4  0 - 0.43 % Final    Segs Relative 02/08/2019 42.0  36 - 66 % Final    Eosinophils % 02/08/2019 0.4  0 - 3 % Final    Basophils % 02/08/2019 0.7  0 - 1 % Final    Lymphocytes % 02/08/2019 44.9* 24 - 44 % Final    Monocytes % 02/08/2019 11.6* 0 - 4 % Final    Total Immature Neutrophil 02/08/2019 0.01  K/CU MM Final    Segs Absolute 02/08/2019 1.1  K/CU MM Final    Eosinophils Absolute 02/08/2019 0.0  K/CU MM Final    Basophils Absolute 02/08/2019 0.0  K/CU MM Final    Lymphocytes Absolute 02/08/2019 1.2  K/CU MM Final    Monocytes Absolute 02/08/2019 0.3  K/CU MM Final    Differential Type 02/08/2019 AUTOMATED DIFF RESULTS CONFIRMED BY SMEAR REVIEWAUTOMATED DIFFERENTIAL   Final    RBC Morphology 02/08/2019 RBC MORPHOLOGY NORMAL   Final    Atypical Lymphocytes Absolute 02/08/2019 2+   Final    PLT Morphology 02/08/2019 RARE   Final    Sodium 02/08/2019 137  135 - 145 MMOL/L Final    Potassium 02/08/2019 4.2  3.5 - 5.1 MMOL/L Final    Chloride 02/08/2019 96* 99 - 110 mMol/L Final    CO2 02/08/2019 31  21 - 32 MMOL/L Final    BUN 02/08/2019 8  6 - 23 MG/DL Final    CREATININE 02/08/2019 0.8* 0.9 - 1.3 MG/DL Final    Glucose, Fasting 02/08/2019 76  70 - 99 MG/DL Final    Calcium 02/08/2019 8.9  8.3 - 10.6 MG/DL Final    Albumin 02/08/2019 4.4  3.4 - 5.0 GM/DL Final    Total Protein 02/08/2019 6.8  6.4 - 8.2 GM/DL Final    Total Bilirubin 02/08/2019 0.3  0.0 - 1.0 MG/DL Final    ALT 02/08/2019 50* 10 - 40 U/L Final    AST 02/08/2019 52* 15 - 37 IU/L Final    Alkaline Phosphatase 02/08/2019 78  40 - 128 IU/L Final    GFR Non- 02/08/2019 >60  >60 mL/min/1.73m2 Final    GFR  02/08/2019 >60  >60 mL/min/1.73m2 Final    Anion Gap 02/08/2019 10  4 - 16 Final    T4 Free 02/08/2019 1.20  0.9 - 1.8 NG/DL Final    TSH, High Sensitivity 02/08/2019 1.360  0.270 - 4.20 uIu/ml Final    Triglycerides 02/08/2019 81  <150 MG/DL Final    Cholesterol 02/08/2019 121  <200 MG/DL Final    HDL 02/08/2019 59  >40 MG/DL Final    LDL Direct 02/08/2019 51  <100 MG/DL Final       Assessment and Plan:  1. Will plan for a EGD/colonoscopy with MAC anesthesia. The patient was informed of the risks and benefits of the procedures. 2.  Abdominal pain might be secondary to acid reflux, gastritis, or peptic ulcer disease. Will order abdominal ultrasound to rule out gallbladder disease. Recommend lifestyle modifications- discussed Ina Rowley to help with alcohol abuse. The patient was encouraged to eat a low fat diet. Recommend avoidance of NSAID's and continue taking Prilosec. 3.  Abdominal bloating may be secondary to diet related abdominal bloating, celiac disease, small bowel bacterial overgrowth (SIBO), or irritable bowel syndrome (IBS). Will order celiac panel and upper endoscopy to rule out celiac disease, as well as a dietician consultation to modify the diet. May consider a breathing test to rule out SIBO. Recommend a diet change, smoking cessation, fiber supplements, and Probiotics. 4.  Esophageal dysphagia most likely due to acid reflux esophagitis, possible Schatzki's ring or eosinophilic esophagitis. He mentioned since starting Prilosec this has improved may be due to esophagitis. Recommend EGD to rule out abnormalities and treat as indicated. 5.  Screening colon will order colonoscopy for colorectal cancer surveillance. 6.  Further recommendations for follow-up will be determined after the EGD/colonoscopy have been completed.

## 2021-11-22 NOTE — PATIENT INSTRUCTIONS
on the day of the test. These medicines can make it hard for your doctor to see your upper GI tract.     · If your doctor tells you to, stop taking iron supplements 7 to 14 days before the test.     · Be sure you have someone to take you home. Anesthesia and pain medicine will make it unsafe for you to drive or get home on your own.     · Understand exactly what procedure is planned, along with the risks, benefits, and other options. · Tell your doctor ALL the medicines, vitamins, supplements, and herbal remedies you take. Some may increase the risk of problems during your procedure. Your doctor will tell you if you should stop taking any of them before the procedure and how soon to do it.     · If you take aspirin or some other blood thinner, ask your doctor if you should stop taking it before your procedure. Make sure that you understand exactly what your doctor wants you to do. These medicines increase the risk of bleeding.     · Make sure your doctor and the hospital have a copy of your advance directive. If you don't have one, you may want to prepare one. It lets others know your health care wishes. It's a good thing to have before any type of surgery or procedure. What happens on the day of the procedure? · Follow the instructions exactly about when to stop eating and drinking. If you don't, your procedure may be canceled. If your doctor told you to take your medicines on the day of the procedure, take them with only a sip of water.     · Take a bath or shower before you come in for your procedure. Do not apply lotions, perfumes, deodorants, or nail polish.     · Take off all jewelry and piercings. And take out contact lenses, if you wear them. At the hospital or surgery center   · Bring a picture ID.     · The test may take 15 to 30 minutes.     · The doctor may spray medicine on the back of your throat to numb it.  You also will get medicine to prevent pain and to relax you.     · You will lie on your left side. The doctor will put the scope in your mouth and toward the back of your throat. The doctor will tell you when to swallow. This helps the scope move down your throat. You will be able to breathe normally. The doctor will move the scope down your esophagus into your stomach. The doctor also may look at the duodenum.     · If your doctor wants to take a sample of tissue for a biopsy, he or she may use small surgical tools, which are put into the scope, to cut off some tissue. You will not feel a biopsy, if one is taken. The doctor also can use the tools to stop bleeding or to do other treatments, if needed.     · You will stay at the hospital or surgery center for 1 to 2 hours until the medicine you were given wears off. What happens after an upper GI endoscopy? · After the test, you may belch and feel bloated for a while.     · You may have a tickling, dry throat or mouth. You may feel a bit hoarse, and you may have a mild sore throat. These symptoms may last several days. Throat lozenges and warm saltwater gargles can help relieve the throat symptoms.     · Don't drive or operate machinery for 12 hours after the test.     · Your doctor will tell you when you can go back to your usual diet and activities.     · Don't drink alcohol for 12 to 24 hours after the test.   When should you call your doctor? · You have questions or concerns.     · You don't understand how to prepare for your procedure.     · You become ill before the procedure (such as fever, flu, or a cold).     · You need to reschedule or have changed your mind about having the procedure. Where can you learn more? Go to https://Simplibuy TechnologiespeCognilab [email or web address].[employer]. org and sign in to your EchoSign account. Enter P790 in the On[employer] box to learn more about \"Upper GI Endoscopy: Before Your Procedure. \"     If you do not have an account, please click on the \"Sign Up Now\" link.   Current as of: February 10, medicines increase the risk of bleeding.     · Make sure your doctor and the hospital have a copy of your advance directive. If you don't have one, you may want to prepare one. It lets others know your health care wishes. It's a good thing to have before any type of surgery or procedure. Before the procedure    · Follow your doctor's directions about when to stop eating solid foods and drink only clear liquids. You can drink water, clear juices, clear broths, flavored ice pops, and gelatin (such as Jell-O). Do not eat or drink anything red or purple. This includes grape juice and grape-flavored ice pops. It also includes fruit punch and cherry gelatin.     · Drink the \"colon prep\" liquid as your doctor tells you. You will want to stay home, because the liquid will make you go to the bathroom a lot. Your stools will be loose and watery. It's very important to drink all of the liquid. If you have problems drinking it, call your doctor.     · Do not eat any solid foods after you drink the colon prep.     · Stop drinking clear liquids for a few hours before the test. Your doctor will tell you how many hours this will be. What happens on the day of the procedure? · Follow the instructions exactly about when to stop eating and drinking. If you don't, your procedure may be canceled. If your doctor told you to take your medicines on the day of the procedure, take them with only a sip of water.     · Take a bath or shower before you come in for your procedure. Do not apply lotions, perfumes, deodorants, or nail polish.     · Take off all jewelry and piercings. And take out contact lenses, if you wear them. At the doctor's office or hospital   · Bring a picture ID.     · You will be kept comfortable and safe by your anesthesia provider. The anesthesia may make you sleep.     · You will lie on your back or your side with your knees drawn up toward your belly. The doctor will gently put a gloved finger into your anus. Then the doctor puts the scope in and moves it into your colon. The scope goes in easily because it is lubricated.     · The doctor may also use small tools to take tissue samples for a biopsy or to remove polyps. This does not hurt.     · The test usually takes 30 to 45 minutes. But it may take longer. It depends on what is found and what is done. When should you call your doctor? · You have questions or concerns.     · You don't understand how to prepare for your procedure.     · You are having trouble with the bowel prep.     · You become ill before the procedure (such as fever, flu, or a cold).     · You need to reschedule or have changed your mind about having the procedure. Where can you learn more? Go to https://Collective [employer].ODEC. org and sign in to your D[employer] account. Enter C315 in the Figment box to learn more about \"Colonoscopy: Before Your Procedure. \"     If you do not have an account, please click on the \"Sign Up Now\" link. Current as of: December 17, 2020               Content Version: 13.0  © 6059-6780 [employer]. Care instructions adapted under license by [facility]. If you have questions about a medical condition or this instruction, always ask your healthcare professional. [place] [address] any warranty or liability for your use of this information. Patient Education        Gas and Bloating: Care Instructions  Your Care Instructions     Gas and bloating can be uncomfortable and embarrassing problems. All people pass gas, but some people produce more gas than others, sometimes enough to cause distress. It is normal to pass gas from 6 to 20 times per day. Excess gas usually is not caused by a serious health problem. Gas and bloating usually are caused by something you eat or drink, including some food supplements and medicines. Gas and bloating are usually harmless and go away without treatment.  However, changing your diet can help end the problem. Some over-the-counter medicines can help prevent gas and relieve bloating. Follow-up care is a key part of your treatment and safety. Be sure to make and go to all appointments, and call your doctor if you are having problems. It's also a good idea to know your test results and keep a list of the medicines you take. How can you care for yourself at home? · Keep a food diary if you think a food gives you gas. Write down what you eat or drink. Also record when you get gas. If you notice that a food seems to cause your gas each time, avoid it and see if the gas goes away. Examples of foods that cause gas include:  ? Fried and fatty foods. ? Beans. ? Vegetables such as artichokes, asparagus, broccoli, brussels sprouts, cabbage, cauliflower, cucumbers, green peppers, onions, peas, radishes, and raw potatoes. ? Fruits such as apricots, bananas, melons, peaches, pears, prunes, and raw apples. ? Wheat and wheat bran. · Soak dry beans in water overnight, then dump the water and cook the soaked beans in new water. This can help prevent gas and bloating. · If you have problems with lactose, avoid dairy products such as milk and cheese. · Try not to swallow air. Do not drink through a straw, gulp your food, or chew gum. · Take an over-the-counter medicine. Read and follow all instructions on the label. ? Food enzymes, such as Beano, can be added to gas-producing foods to prevent gas. ? Antacids, such as Maalox Anti-Gas and Mylanta Gas, can relieve bloating by making you burp. Be careful when you take over-the-counter antacid medicines. Many of these medicines have aspirin in them. Read the label to make sure that you are not taking more than the recommended dose. Too much aspirin can be harmful. ? Activated charcoal tablets, such as CharcoCaps, may decrease odor from gas you pass.   ? If you have problems with lactose, you can take medicines such as Dairy Ease and Lactaid with dairy products to prevent gas and bloating. · Get some exercise regularly. When should you call for help? Call 911 anytime you think you may need emergency care. For example, call if:    · You have gas and signs of a heart attack, such as:  ? Chest pain or pressure. ? Sweating. ? Shortness of breath. ? Nausea or vomiting. ? Pain that spreads from the chest to the neck, jaw, or one or both shoulders or arms. ? Dizziness or lightheadedness. ? A fast or uneven pulse. After calling 911, chew 1 adult-strength aspirin. Wait for an ambulance. Do not try to drive yourself. Call your doctor now or seek immediate medical care if:    · You have severe belly pain.     · You have blood in your stool. Watch closely for changes in your health, and be sure to contact your doctor if:    · You have blood or pus in your urine.     · Your urine is cloudy or smells bad.     · You are burping and have trouble swallowing.     · You feel bloated and have swelling in your belly.     · You do not get better as expected. Where can you learn more? Go to https://Eth[employer]Gen.[employer]. org and sign in to your [employer] account. Enter Y249 in the Collarity box to learn more about \"Gas and Bloating: Care Instructions. \"     If you do not have an account, please click on the \"Sign Up Now\" link. Current as of: July 1, 2021               Content Version: 13.0  © 1064-4614 Healthwise, [facility]. Care instructions adapted under license by [facility] ([facility]). If you have questions about a medical condition or this instruction, always ask your healthcare professional. [place] [address] any warranty or liability for your use of this information.

## 2021-11-29 ENCOUNTER — PREP FOR PROCEDURE (OUTPATIENT)
Dept: GASTROENTEROLOGY | Age: 46
End: 2021-11-29

## 2021-11-29 RX ORDER — SODIUM CHLORIDE 0.9 % (FLUSH) 0.9 %
5-40 SYRINGE (ML) INJECTION EVERY 12 HOURS SCHEDULED
Status: CANCELLED | OUTPATIENT
Start: 2021-11-29

## 2021-11-29 RX ORDER — SODIUM CHLORIDE 0.9 % (FLUSH) 0.9 %
5-40 SYRINGE (ML) INJECTION PRN
Status: CANCELLED | OUTPATIENT
Start: 2021-11-29

## 2021-11-29 RX ORDER — SODIUM CHLORIDE 9 MG/ML
25 INJECTION, SOLUTION INTRAVENOUS PRN
Status: CANCELLED | OUTPATIENT
Start: 2021-11-29

## 2021-11-29 RX ORDER — SODIUM CHLORIDE, SODIUM LACTATE, POTASSIUM CHLORIDE, CALCIUM CHLORIDE 600; 310; 30; 20 MG/100ML; MG/100ML; MG/100ML; MG/100ML
INJECTION, SOLUTION INTRAVENOUS CONTINUOUS
Status: CANCELLED | OUTPATIENT
Start: 2021-11-29

## 2021-12-30 ENCOUNTER — TELEPHONE (OUTPATIENT)
Dept: GASTROENTEROLOGY | Age: 46
End: 2021-12-30

## 2021-12-30 NOTE — TELEPHONE ENCOUNTER
Phoned patient requesting he return my call with new insurance info or new ID#. Trying to PA his procedures for 1/28/22.

## 2022-01-20 ENCOUNTER — HOSPITAL ENCOUNTER (OUTPATIENT)
Age: 47
Setting detail: SPECIMEN
Discharge: HOME | End: 2022-01-20
Payer: COMMERCIAL

## 2022-01-20 ENCOUNTER — NURSE ONLY (OUTPATIENT)
Dept: GASTROENTEROLOGY | Age: 47
End: 2022-01-20

## 2022-01-20 DIAGNOSIS — Z01.818 PREOP TESTING: Primary | ICD-10-CM

## 2022-01-20 PROCEDURE — U0005 INFEC AGEN DETEC AMPLI PROBE: HCPCS

## 2022-01-20 PROCEDURE — U0003 INFECTIOUS AGENT DETECTION BY NUCLEIC ACID (DNA OR RNA); SEVERE ACUTE RESPIRATORY SYNDROME CORONAVIRUS 2 (SARS-COV-2) (CORONAVIRUS DISEASE [COVID-19]), AMPLIFIED PROBE TECHNIQUE, MAKING USE OF HIGH THROUGHPUT TECHNOLOGIES AS DESCRIBED BY CMS-2020-01-R: HCPCS

## 2022-01-20 NOTE — PROGRESS NOTES
Patient is here for a pre-op covid screening for upcoming procedure with Dr. Alma Atwood. Patient tolerated, questions were answered, and another copy of the prep instructions were given.

## 2022-01-21 ENCOUNTER — TELEPHONE (OUTPATIENT)
Dept: GASTROENTEROLOGY | Age: 47
End: 2022-01-21

## 2022-01-21 LAB
SARS-COV-2: NOT DETECTED
SOURCE: NORMAL

## 2022-01-21 NOTE — TELEPHONE ENCOUNTER
[facility] prior auth form along with notes for prior approval of C-scope and EGD. Marked it as urgent do to upcoming procedure next week.

## 2022-01-21 NOTE — PROGRESS NOTES
Patient will arrive at 1000 at [facility] on 1/28/2022 for his procedure at 1130.  1. Do not eat or drink anything after 12 midnight (or____hours) unless instructed by your doctor prior to surgery. This includes no water, chewing gum or mints. NO chewing tobacco.  2. Follow your directions as prescribed by the doctor for your procedure and medications. 3.Check with your Doctor regarding stopping Plavix, Coumadin, Lovenox,Effient,Pradaxa,Xarelto, Fragmin or other blood thinners and follow their instructions. 4. Do not smoke, and do not drink any alcoholic beverages 24 hours prior to surgery. This includes NA Beer. 5. You may brush your teeth and gargle the morning of surgery. DO NOT SWALLOW WATER  6. You MUST make arrangements for a responsible adult to take you home after your surgery and be able to check on you every couple hours for the day. You will not be allowed     to leave alone or drive yourself home. It is strongly suggested someone stay with you the first 24 hrs. Your surgery will be cancelled if you do not have a ride home. 7. Please wear simple, loose fitting clothing to the hospital.  Wenceslao Slight not bring valuables (money, credit cards, checkbooks, etc.) Do not wear any makeup (including no eye makeup) or nail polish on your fingers or toes. 8. DO NOT wear any jewelry or piercings on day of surgery. All body piercing jewelry must be removed  9. If you have dentures, they will be removed before going to the OR; we will provide you a container. If you wear contact lenses or glasses, they will be removed; please bring a case for them. 10. If you  have a Living Will and Durable Power of  for Healthcare, please bring in a copy. 11 Please bring picture ID,  insurance card, paperwork from the doctors office    (H & P, Consent, & card for implantable devices). Patient will take his prilosec the morning of his procedure.

## 2022-01-24 ENCOUNTER — TELEPHONE (OUTPATIENT)
Dept: GASTROENTEROLOGY | Age: 47
End: 2022-01-24

## 2022-01-26 ENCOUNTER — ANESTHESIA EVENT (OUTPATIENT)
Dept: OPERATING ROOM | Age: 47
End: 2022-01-26
Payer: COMMERCIAL

## 2022-01-26 NOTE — PROGRESS NOTES
Left message for Sylvia Chaves confirming arrival time of 1000 at [address] [facility],  [address], on 1/28/2022.

## 2022-01-27 ASSESSMENT — LIFESTYLE VARIABLES: SMOKING_STATUS: 1

## 2022-01-27 NOTE — ANESTHESIA PRE PROCEDURE
Department of Anesthesiology  Preprocedure Note       Name:  Felix Gordon   Age:  49 y.o.  :  1975                                          MRN:  [id number]         Date:  2022      Surgeon: Gómez Vásquez):  Anthony Lee MD    Procedure: Procedure(s):  COLORECTAL CANCER SCREENING, NOT HIGH RISK  EGD ESOPHAGOGASTRODUODENOSCOPY    Medications prior to admission:   Prior to Admission medications    Medication Sig Start Date End Date Taking? Authorizing Provider   bisacodyl (BISACODYL) 5 MG EC tablet Take 4 tablets once for colonoscopy prep 21   TIANNA Aleman - CNP   omeprazole (PRILOSEC) 40 MG delayed release capsule Take 1 capsule by mouth every morning (before breakfast) 21   Michael Chavez MD       Current medications:    No current facility-administered medications for this encounter. Current Outpatient Medications   Medication Sig Dispense Refill    bisacodyl (BISACODYL) 5 MG EC tablet Take 4 tablets once for colonoscopy prep 4 tablet 0    omeprazole (PRILOSEC) 40 MG delayed release capsule Take 1 capsule by mouth every morning (before breakfast) 30 capsule 5       Allergies:  No Known Allergies    Problem List:    Patient Active Problem List   Diagnosis Code    Low back pain M54.50    Cardiomyopathy ([address] I42.9    Alcohol use disorder, moderate, dependence ([address] F10.20    Metatarsalgia of left foot M77.42       Past Medical History:        Diagnosis Date    Cardiomyopathy ([address] 2013    Low back pain 2013    Scar tissue     on lungs       Past Surgical History:        Procedure Laterality Date    ENDOSCOPY, COLON, DIAGNOSTIC      VASECTOMY         Social History:    Social History     Tobacco Use    Smoking status: Current Every Day Smoker     Packs/day: 1.00     Years: 25.00     Pack years: 25.00     Types: Cigarettes     Start date: 1990    Smokeless tobacco: Former User     Types: Chew   Substance Use Topics    Alcohol use:  Yes Alcohol/week: 2.0 standard drinks     Types: 1 Cans of beer, 1 Shots of liquor per week                                Ready to quit: Not Answered  Counseling given: Not Answered      Vital Signs (Current):   Vitals:    01/21/22 1424   Weight: 193 lb (87.5 kg)   Height: 5' 7\" (1.702 m)                                              BP Readings from Last 3 Encounters:   11/22/21 (!) 150/94   08/18/21 (!) 138/90   12/21/20 125/80       NPO Status:                                                                                 BMI:   Wt Readings from Last 3 Encounters:   11/22/21 198 lb 6.4 oz (90 kg)   08/18/21 195 lb (88.5 kg)   12/21/20 200 lb (90.7 kg)     Body mass index is 30.23 kg/m². CBC:   Lab Results   Component Value Date    WBC 2.7 02/08/2019    RBC 5.22 02/08/2019    HGB 16.8 02/08/2019    HCT 50.8 02/08/2019    MCV 97.3 02/08/2019    RDW 12.2 02/08/2019     02/08/2019       CMP:   Lab Results   Component Value Date     02/08/2019    K 4.2 02/08/2019    CL 96 02/08/2019    CO2 31 02/08/2019    BUN 8 02/08/2019    CREATININE 0.8 02/08/2019    GFRAA >60 02/08/2019    AGRATIO 2.1 11/30/2015    LABGLOM >60 02/08/2019    GLUCOSE 101 11/30/2015    GLUCOSE 93 05/15/2015    PROT 6.8 02/08/2019    CALCIUM 8.9 02/08/2019    BILITOT 0.3 02/08/2019    ALKPHOS 78 02/08/2019    AST 52 02/08/2019    ALT 50 02/08/2019       POC Tests: No results for input(s): POCGLU, POCNA, POCK, POCCL, POCBUN, POCHEMO, POCHCT in the last 72 hours.     Coags: No results found for: PROTIME, INR, APTT    HCG (If Applicable): No results found for: PREGTESTUR, PREGSERUM, HCG, HCGQUANT     ABGs: No results found for: PHART, PO2ART, EYE3UIB, SPJ5YNO, BEART, V7UXDOKS     Type & Screen (If Applicable):  No results found for: LABABO, LABRH    Drug/Infectious Status (If Applicable):  No results found for: HIV, HEPCAB    COVID-19 Screening (If Applicable):   Lab Results   Component Value Date    COVID19 NOT DETECTED 01/20/2022

## 2022-01-28 ENCOUNTER — ANESTHESIA (OUTPATIENT)
Dept: OPERATING ROOM | Age: 47
End: 2022-01-28
Payer: COMMERCIAL

## 2022-01-28 ENCOUNTER — HOSPITAL ENCOUNTER (OUTPATIENT)
Age: 47
Setting detail: OUTPATIENT SURGERY
Discharge: HOME | End: 2022-01-28
Attending: INTERNAL MEDICINE | Admitting: INTERNAL MEDICINE
Payer: COMMERCIAL

## 2022-01-28 VITALS
SYSTOLIC BLOOD PRESSURE: 144 MMHG | RESPIRATION RATE: 16 BRPM | OXYGEN SATURATION: 97 % | TEMPERATURE: 97.1 F | BODY MASS INDEX: 30.29 KG/M2 | WEIGHT: 193 LBS | HEIGHT: 67 IN | HEART RATE: 80 BPM | DIASTOLIC BLOOD PRESSURE: 81 MMHG

## 2022-01-28 VITALS
OXYGEN SATURATION: 96 % | SYSTOLIC BLOOD PRESSURE: 84 MMHG | DIASTOLIC BLOOD PRESSURE: 54 MMHG | RESPIRATION RATE: 15 BRPM

## 2022-01-28 DIAGNOSIS — R14.0 BLOATING: ICD-10-CM

## 2022-01-28 DIAGNOSIS — Z12.11 SCREEN FOR COLON CANCER: ICD-10-CM

## 2022-01-28 DIAGNOSIS — R10.13 EPIGASTRIC PAIN: ICD-10-CM

## 2022-01-28 PROCEDURE — 2580000003 HC RX 258

## 2022-01-28 PROCEDURE — 7100000011 HC PHASE II RECOVERY - ADDTL 15 MIN: Performed by: INTERNAL MEDICINE

## 2022-01-28 PROCEDURE — 3609012400 HC EGD TRANSORAL BIOPSY SINGLE/MULTIPLE: Performed by: INTERNAL MEDICINE

## 2022-01-28 PROCEDURE — 2709999900 HC NON-CHARGEABLE SUPPLY: Performed by: INTERNAL MEDICINE

## 2022-01-28 PROCEDURE — 3609010200 HC COLONOSCOPY ABLATION TUMOR POLYP/OTHER LES: Performed by: INTERNAL MEDICINE

## 2022-01-28 PROCEDURE — 3700000001 HC ADD 15 MINUTES (ANESTHESIA): Performed by: INTERNAL MEDICINE

## 2022-01-28 PROCEDURE — 7100000010 HC PHASE II RECOVERY - FIRST 15 MIN: Performed by: INTERNAL MEDICINE

## 2022-01-28 PROCEDURE — 6360000002 HC RX W HCPCS

## 2022-01-28 PROCEDURE — 43239 EGD BIOPSY SINGLE/MULTIPLE: CPT | Performed by: INTERNAL MEDICINE

## 2022-01-28 PROCEDURE — 3700000000 HC ANESTHESIA ATTENDED CARE: Performed by: INTERNAL MEDICINE

## 2022-01-28 PROCEDURE — 88305 TISSUE EXAM BY PATHOLOGIST: CPT | Performed by: PATHOLOGY

## 2022-01-28 PROCEDURE — 45385 COLONOSCOPY W/LESION REMOVAL: CPT | Performed by: INTERNAL MEDICINE

## 2022-01-28 PROCEDURE — 88342 IMHCHEM/IMCYTCHM 1ST ANTB: CPT | Performed by: PATHOLOGY

## 2022-01-28 PROCEDURE — 2580000003 HC RX 258: Performed by: NURSE PRACTITIONER

## 2022-01-28 RX ORDER — SODIUM CHLORIDE 0.9 % (FLUSH) 0.9 %
5-40 SYRINGE (ML) INJECTION PRN
Status: DISCONTINUED | OUTPATIENT
Start: 2022-01-28 | End: 2022-01-28 | Stop reason: HOSPADM

## 2022-01-28 RX ORDER — SODIUM CHLORIDE, SODIUM LACTATE, POTASSIUM CHLORIDE, CALCIUM CHLORIDE 600; 310; 30; 20 MG/100ML; MG/100ML; MG/100ML; MG/100ML
INJECTION, SOLUTION INTRAVENOUS CONTINUOUS
Status: DISCONTINUED | OUTPATIENT
Start: 2022-01-28 | End: 2022-01-28 | Stop reason: HOSPADM

## 2022-01-28 RX ORDER — SODIUM CHLORIDE 0.9 % (FLUSH) 0.9 %
5-40 SYRINGE (ML) INJECTION EVERY 12 HOURS SCHEDULED
Status: DISCONTINUED | OUTPATIENT
Start: 2022-01-28 | End: 2022-01-28 | Stop reason: HOSPADM

## 2022-01-28 RX ORDER — SODIUM CHLORIDE 9 MG/ML
25 INJECTION, SOLUTION INTRAVENOUS PRN
Status: DISCONTINUED | OUTPATIENT
Start: 2022-01-28 | End: 2022-01-28 | Stop reason: HOSPADM

## 2022-01-28 RX ORDER — SODIUM CHLORIDE, SODIUM LACTATE, POTASSIUM CHLORIDE, CALCIUM CHLORIDE 600; 310; 30; 20 MG/100ML; MG/100ML; MG/100ML; MG/100ML
INJECTION, SOLUTION INTRAVENOUS CONTINUOUS PRN
Status: DISCONTINUED | OUTPATIENT
Start: 2022-01-28 | End: 2022-01-28 | Stop reason: SDUPTHER

## 2022-01-28 RX ORDER — LIDOCAINE HYDROCHLORIDE 20 MG/ML
INJECTION, SOLUTION INTRAVENOUS PRN
Status: DISCONTINUED | OUTPATIENT
Start: 2022-01-28 | End: 2022-01-28 | Stop reason: SDUPTHER

## 2022-01-28 RX ORDER — PROPOFOL 10 MG/ML
INJECTION, EMULSION INTRAVENOUS PRN
Status: DISCONTINUED | OUTPATIENT
Start: 2022-01-28 | End: 2022-01-28 | Stop reason: SDUPTHER

## 2022-01-28 RX ADMIN — LIDOCAINE HYDROCHLORIDE 100 MG: 20 INJECTION, SOLUTION INTRAVENOUS at 10:54

## 2022-01-28 RX ADMIN — SODIUM CHLORIDE, POTASSIUM CHLORIDE, SODIUM LACTATE AND CALCIUM CHLORIDE: 600; 310; 30; 20 INJECTION, SOLUTION INTRAVENOUS at 10:43

## 2022-01-28 RX ADMIN — SODIUM CHLORIDE, POTASSIUM CHLORIDE, SODIUM LACTATE AND CALCIUM CHLORIDE: 600; 310; 30; 20 INJECTION, SOLUTION INTRAVENOUS at 10:49

## 2022-01-28 RX ADMIN — PROPOFOL 580 MG: 10 INJECTION, EMULSION INTRAVENOUS at 10:54

## 2022-01-28 ASSESSMENT — PAIN SCALES - GENERAL
PAINLEVEL_OUTOF10: 0
PAINLEVEL_OUTOF10: 0

## 2022-01-28 ASSESSMENT — LIFESTYLE VARIABLES: SMOKING_STATUS: 1

## 2022-01-28 NOTE — PROGRESS NOTES
1126 Pt received from [facility] and report received from [facility]. Call light in reach. Pt declined beverage offer. Wife at bedside. Pt abdomen soft and non tender.

## 2022-01-28 NOTE — H&P
HISTORY & PHYSICAL:  Patient's history with special attention to the cardiovascular, pulmonary systems and the current problem was reviewed with the patient immediately prior to the procedure. Medications, allergies, and pertinent laboratory tests were also reviewed at this time. The physical examination,as below, was then performed. Patient assessed to be ASA Class 2. Mallampati Airway Assessment: 2. History of adverse reactions involving sedation/anesthesia. None  Family history of adverse reaction to sedation/anesthesia. None      PHYSICAL EXAMINATION    Ht 5' 7\" (1.702 m)   Wt 193 lb (87.5 kg)   BMI 30.23 kg/m²     Mouth and Pharynx : Normal without focal findings  Cardiac: Regular rate and rhythm  Pulmonary: Occasional wheezes bilaterally  Neurological: Normal without focal findings   Abdomen: Soft, non-tender, non-distended     Written informed consent obtained from patient. Risks (including but not limited to perforation, bloating and bleeding,) benefits and alternatives explained and questions answered. Patient verbalized understanding. Based on history and airway assessment patient is an appropriate candidate for  sedation.     Graham Armstrong MD  01/28/22

## 2022-01-28 NOTE — PROGRESS NOTES
1145 Pt up to restroom with assist. Pt tolerated well and he got dressed in restroom with wife. Pt back to room and called their ride to pick them up. Call light in reach. Pt abdomen soft and non tender. Wife at bedside. 1200 Pt discharged to home to friends vehicle.

## 2022-01-28 NOTE — BRIEF OP NOTE
Brief Postoperative Note      Patient: Chela Leigh  YOB: 1975  MRN: [id number]    Date of Procedure: 1/28/2022    Pre-Op Diagnosis: Epigastric pain [R10.13] Bloating [R14.0] Screen for colon cancer [Z12.11]    Post-Op Diagnosis: Gastritis, bx taken to check for h pylori, small hiatal hernia. 10-15 mm polyp removed in sigmoid colon with hot snare. Procedure(s):  COLONOSCOPY POLYPECTOMY ABLATION  EGD BIOPSY    Surgeon(s):  Ericka Gtz MD    Assistant:  * No surgical staff found *    Anesthesia: Monitor Anesthesia Care    Estimated Blood Loss (mL): Minimal    Complications: None    Specimens:   ID Type Source Tests Collected by Time Destination   A :  Tissue Stomach SURGICAL PATHOLOGY Ericka Gtz MD 1/28/2022 1058    B : cap II Tissue Colon SURGICAL PATHOLOGY Ericka Gtz MD 1/28/2022 1116        Implants:  * No implants in log *      Drains: * No LDAs found *    Findings: FU with [facility], repeat colon in 5 years.      Electronically signed by Ericka Gtz MD on 1/28/2022 at 11:19 AM

## 2022-01-28 NOTE — ANESTHESIA POSTPROCEDURE EVALUATION
Department of Anesthesiology  Postprocedure Note    Patient: Jatin Albrecht  MRN: [id number]  YOB: 1975  Date of evaluation: 1/28/2022  Time:  11:19 AM     Procedure Summary     Date: 01/28/22 Room / Location: [address]    Anesthesia Start: 1049 Anesthesia Stop:     Procedures:       COLONOSCOPY POLYPECTOMY ABLATION (N/A )      EGD BIOPSY (N/A ) Diagnosis:       Epigastric pain      Bloating      Screen for colon cancer      (Epigastric pain [R10.13] Bloating [R14.0] Screen for colon cancer [Z12.11])    Surgeons: Gopi Conley MD Responsible Provider: Catrachita Simpson    Anesthesia Type: MAC ASA Status: 2          Anesthesia Type: No value filed. Luz Marina Phase I:      Luz Marina Phase II:      Last vitals: Reviewed and per EMR flowsheets.        Anesthesia Post Evaluation    Patient location during evaluation: bedside  Patient participation: complete - patient participated  Level of consciousness: awake and alert  Pain score: 0  Airway patency: patent  Nausea & Vomiting: no nausea and no vomiting  Complications: no  Cardiovascular status: blood pressure returned to baseline  Respiratory status: acceptable, spontaneous ventilation and room air

## 2022-01-28 NOTE — ANESTHESIA PRE PROCEDURE
Department of Anesthesiology  Preprocedure Note       Name:  Steven Saenz   Age:  49 y.o.  :  1975                                          MRN:  [id number]         Date:  2022      Surgeon: Blanca Min):  Jennie Zapata MD    Procedure: Procedure(s):  COLORECTAL CANCER SCREENING, NOT HIGH RISK  EGD ESOPHAGOGASTRODUODENOSCOPY    Medications prior to admission:   Prior to Admission medications    Medication Sig Start Date End Date Taking? Authorizing Provider   bisacodyl (BISACODYL) 5 MG EC tablet Take 4 tablets once for colonoscopy prep 21   TIANNA Arceo - CNP   omeprazole (PRILOSEC) 40 MG delayed release capsule Take 1 capsule by mouth every morning (before breakfast) 21   Cooper Pérez MD       Current medications:    No current outpatient medications on file. No current facility-administered medications for this visit. Allergies:  No Known Allergies    Problem List:    Patient Active Problem List   Diagnosis Code    Low back pain M54.50    Cardiomyopathy ([facility] I42.9    Alcohol use disorder, moderate, dependence ([address] F10.20    Metatarsalgia of left foot M77.42       Past Medical History:        Diagnosis Date    Cardiomyopathy ([address] 2013    Low back pain 2013    Scar tissue     on lungs       Past Surgical History:        Procedure Laterality Date    ENDOSCOPY, COLON, DIAGNOSTIC      VASECTOMY         Social History:    Social History     Tobacco Use    Smoking status: Current Every Day Smoker     Packs/day: 1.00     Years: 25.00     Pack years: 25.00     Types: Cigarettes     Start date: 1990    Smokeless tobacco: Former User     Types: Chew   Substance Use Topics    Alcohol use: Yes     Alcohol/week: 2.0 standard drinks     Types: 1 Cans of beer, 1 Shots of liquor per week                                Ready to quit: Not Answered  Counseling given: Not Answered      Vital Signs (Current): There were no vitals filed for this visit. BP Readings from Last 3 Encounters:   01/28/22 (!) 158/96   11/22/21 (!) 150/94   08/18/21 (!) 138/90       NPO Status:                                                                                 BMI:   Wt Readings from Last 3 Encounters:   01/21/22 193 lb (87.5 kg)   11/22/21 198 lb 6.4 oz (90 kg)   08/18/21 195 lb (88.5 kg)     There is no height or weight on file to calculate BMI.    CBC:   Lab Results   Component Value Date    WBC 2.7 02/08/2019    RBC 5.22 02/08/2019    HGB 16.8 02/08/2019    HCT 50.8 02/08/2019    MCV 97.3 02/08/2019    RDW 12.2 02/08/2019     02/08/2019       CMP:   Lab Results   Component Value Date     02/08/2019    K 4.2 02/08/2019    CL 96 02/08/2019    CO2 31 02/08/2019    BUN 8 02/08/2019    CREATININE 0.8 02/08/2019    GFRAA >60 02/08/2019    AGRATIO 2.1 11/30/2015    LABGLOM >60 02/08/2019    GLUCOSE 101 11/30/2015    GLUCOSE 93 05/15/2015    PROT 6.8 02/08/2019    CALCIUM 8.9 02/08/2019    BILITOT 0.3 02/08/2019    ALKPHOS 78 02/08/2019    AST 52 02/08/2019    ALT 50 02/08/2019       POC Tests: No results for input(s): POCGLU, POCNA, POCK, POCCL, POCBUN, POCHEMO, POCHCT in the last 72 hours.     Coags: No results found for: PROTIME, INR, APTT    HCG (If Applicable): No results found for: PREGTESTUR, PREGSERUM, HCG, HCGQUANT     ABGs: No results found for: PHART, PO2ART, VPY7EKR, PWS7DZQ, BEART, O8QUQFQY     Type & Screen (If Applicable):  No results found for: LABABO, LABRH    Drug/Infectious Status (If Applicable):  No results found for: HIV, HEPCAB    COVID-19 Screening (If Applicable):   Lab Results   Component Value Date    COVID19 NOT DETECTED 01/20/2022           Anesthesia Evaluation  Patient summary reviewed  Airway: Mallampati: II  TM distance: >3 FB   Neck ROM: full  Mouth opening: > = 3 FB Dental:          Pulmonary:   (+) wheezes,  current smoker                           Cardiovascular:Negative CV ROS  Exercise

## 2022-01-28 NOTE — PROGRESS NOTES
Received report from [facility] prior to transfer to endo unit. Pt is alert and oriented, drank all of prep and has been NPO appropriate amount of time.  He takes no blood thinners or beta blockers

## 2022-02-09 ENCOUNTER — TELEPHONE (OUTPATIENT)
Dept: FAMILY MEDICINE CLINIC | Age: 47
End: 2022-02-09

## 2022-02-09 NOTE — TELEPHONE ENCOUNTER
Spoke to pt and he needs to have [place] paperwork [place] for issues with his stomach, Britton Moy told patient that they could not fill out for anything but the one day and that was for procedure. Please advise.

## 2022-02-09 NOTE — TELEPHONE ENCOUNTER
----- Message from [employer] sent at 2/9/2022  1:04 PM EST -----  Subject: Message to Provider    QUESTIONS  Information for Provider? pt needs the FMLA paperworks filled out from   1/28th thru 2/9/22 because of stomach issues, had colonoscopy done on   1/28/22 please call back @ [phone]  ---------------------------------------------------------------------------  --------------  CALL BACK INFO  What is the best way for the office to contact you? OK to leave message on   voicemail  Preferred Call Back Phone Number? [phone]  ---------------------------------------------------------------------------  --------------  SCRIPT ANSWERS  Relationship to Patient?  Self

## 2022-02-09 NOTE — TELEPHONE ENCOUNTER
pt needs the FMLA paperworks filled out from   1/28th thru 2/9/22 because of stomach issues, had colonoscopy done on   1/28/22 please call back @ [phone]  Left a vm for pt to call back

## 2022-02-14 ENCOUNTER — OFFICE VISIT (OUTPATIENT)
Dept: GASTROENTEROLOGY | Age: 47
End: 2022-02-14
Payer: COMMERCIAL

## 2022-02-14 VITALS
OXYGEN SATURATION: 94 % | HEART RATE: 92 BPM | DIASTOLIC BLOOD PRESSURE: 84 MMHG | TEMPERATURE: 96.6 F | SYSTOLIC BLOOD PRESSURE: 136 MMHG | WEIGHT: 202.4 LBS | BODY MASS INDEX: 31.77 KG/M2 | HEIGHT: 67 IN

## 2022-02-14 DIAGNOSIS — K29.50 CHRONIC GASTRITIS WITHOUT BLEEDING, UNSPECIFIED GASTRITIS TYPE: ICD-10-CM

## 2022-02-14 DIAGNOSIS — R14.0 BLOATING: ICD-10-CM

## 2022-02-14 DIAGNOSIS — K63.5 HYPERPLASTIC POLYP OF SIGMOID COLON: ICD-10-CM

## 2022-02-14 DIAGNOSIS — R10.13 EPIGASTRIC PAIN: Primary | ICD-10-CM

## 2022-02-14 PROCEDURE — 99213 OFFICE O/P EST LOW 20 MIN: CPT | Performed by: NURSE PRACTITIONER

## 2022-02-14 RX ORDER — BUSPIRONE HYDROCHLORIDE 10 MG/1
10 TABLET ORAL 3 TIMES DAILY
Qty: 90 TABLET | Refills: 3 | Status: SHIPPED | OUTPATIENT
Start: 2022-02-14 | End: 2022-03-16

## 2022-02-14 NOTE — PROGRESS NOTES
Wil Cowan 49 y.o. male was seen by DOMINIK Mas on 2/14/2022     Wt Readings from Last 3 Encounters:   02/14/22 202 lb 6.4 oz (91.8 kg)   01/21/22 193 lb (87.5 kg)   11/22/21 198 lb 6.4 oz (90 kg)       YOUNG Smith is a pleasant 49 y.o.  male who presents today for follow-up on EGD and colonoscopy. He did not complete lab work or abdominal ultrasound due to financial concerns. His EGD showed normal appearing esophagus, 1-2 cm hiatal hernia, normal appearing stomach with gastritis and normal appearing duodenal bulb with duodenitis. His stomach biopsies showed normal tissue with no evidence of H. Pylori infection. His colonoscopy showed hyperplastic (benign) sigmoid colon polyp that was removed; otherwise normal appearing colon. He reports feeling fair. He continues to drink alcohol daily mentioned two beers has cut back on hard liquor. He smokes a pack of cigarettes per day. His appetite is good and weight is stable. He continues to have abdominal pain pain described as 4/10 pressure like pain to left upper quadrant with bloating. His pain improves with drinking alcohol. His pain is worse after eating a heavy meal, eating vegetables and salads. Dairy upsets his stomach. No nausea or vomiting. Intermittent heartburn and acid reflux. He is taking Prilosec. No nocturnal awakenings with acid reflux. No current dysphagia or pain with swallowing. No excess belching. He has excess flatulence. His typical bowel pattern is daily with soft brown formed stools. No diarrhea or constipation. No blood in his stools or melena. No family history of stomach or colon cancer.       ROS  Review of Systems   Constitutional: Negative for appetite change, chills, diaphoresis, fatigue, fever and unexpected weight change. HENT: Negative for ear pain and hearing loss. Eyes: Negative for pain, discharge and visual disturbance. Respiratory: Positive for cough.  Negative for shortness of breath and wheezing. Cardiovascular: Negative for chest pain, palpitations and leg swelling. Gastrointestinal: Positive for abdominal pain. Negative for blood in stool, constipation, diarrhea, nausea and vomiting. Endocrine: Negative for cold intolerance and heat intolerance. Genitourinary: Negative for dysuria, frequency, hematuria and urgency. Musculoskeletal: Positive for back pain, myalgias and neck pain. Skin: Negative for color change, pallor and rash. Allergic/Immunologic: Negative for environmental allergies and food allergies. Neurological: Negative for dizziness, seizures, weakness and headaches. Hematological: Does not bruise/bleed easily. Psychiatric/Behavioral: Positive for dysphoric mood and sleep disturbance. Negative for suicidal ideas. The patient is nervous/anxious.         Allergies  No Known Allergies    Medications  Current Outpatient Medications   Medication Sig Dispense Refill    omeprazole (PRILOSEC) 40 MG delayed release capsule Take 1 capsule by mouth every morning (before breakfast) 30 capsule 5     No current facility-administered medications for this visit. Past medical history:   He has a past medical history of Cardiomyopathy ([address] Low back pain, and Scar tissue. Past surgical history:  He has a past surgical history that includes Vasectomy; Endoscopy, colon, diagnostic; Colonoscopy (N/A, 1/28/2022); and Upper gastrointestinal endoscopy (N/A, 1/28/2022). Social History:  He reports that he has been smoking cigarettes. He started smoking about 32 years ago. He has a 25.00 pack-year smoking history. He has quit using smokeless tobacco.  His smokeless tobacco use included chew. He reports current alcohol use of about 2.0 standard drinks of alcohol per week. He reports that he does not use drugs. Family history:  His family history includes Heart Attack in his father; Other in his mother.     Objective    Vitals:    02/14/22 1615   BP: 136/84   Pulse: 92   Temp: 96.6 °F (35.9 °C)   SpO2: 94%        Physical exam    Physical Exam  Constitutional:       General: He is not in acute distress. Appearance: He is well-developed. He is obese. He is not ill-appearing, toxic-appearing or diaphoretic. HENT:      Head: Normocephalic and atraumatic. Nose: Nose normal.      Mouth/Throat:      Mouth: Mucous membranes are moist.   Cardiovascular:      Rate and Rhythm: Normal rate and regular rhythm. Pulses: Normal pulses. Heart sounds: Normal heart sounds. No murmur heard. No gallop. Pulmonary:      Effort: Pulmonary effort is normal. No respiratory distress. Breath sounds: Normal breath sounds. No stridor. No wheezing or rhonchi. Abdominal:      General: Bowel sounds are normal. There is no distension. Palpations: Abdomen is soft. There is no mass. Tenderness: There is abdominal tenderness. Hernia: No hernia is present. Musculoskeletal:         General: Normal range of motion. Cervical back: Neck supple. Skin:     General: Skin is warm and dry. Neurological:      Mental Status: He is alert and oriented to person, place, and time. Psychiatric:         Mood and Affect: Mood normal.         Hospital Outpatient Visit on 01/20/2022   Component Date Value Ref Range Status    Source 01/20/2022 THROAT   Final    SARS-CoV-2 01/20/2022 NOT DETECTED  NOT DETECTED Final    Comment:         The specimen is NEGATIVE for SARS-CoV-2, the Novel Coronavirus associated with COVID-19. A negaitve test does not rule out COVID-19. This test has been authorized by the FDA under an Emergency Use Authorization (EUA) for use   by authorized laboratories. [employer] SARS-CoV2 reagents for [employer] System are designed to detect the virus that causes   COVID-19 in patients with signs and symptoms of infection who are suspected of COVID-19.   An individual without symptoms of COVID-19 and who is not shedding SARS-CoV-2 virus would   expect to have a negaitve (not detected) result in this assay. Fact sheet for Healthcare Providers: [employer].com.br  Fact sheet for Patients: [employer].br          Methodology: RT-PCR         Assessment and Plan:  1. Abdominal pain might be secondary to diet related acid reflux gastritis, or functional dyspepsia. Will order Buspar 10 mg take 15 minutes before meals to improve gastric accomodation and visceral hypersensitivity. He was advised to stop alcohol use and NSAID use. His EGD showed 1-2 cm hiatal hernia with no evidence of gastritis, H. Pylori infection or peptic ulcer disease. Recommend taking Prilosec as directed. Will order abdominal ultrasound to rule out gallbladder disease. Recommend diet and lifestyle modifications- discussed [employer] to help with alcohol abuse and smoking cessation. The patient was encouraged to eat a low fat diet. 2.  Abdominal bloating may be secondary to diet related abdominal bloating, gastritis, small bowel bacterial overgrowth (SIBO), or irritable bowel syndrome (IBS). He did not complete lab work or abdominal ultrasound due to financial concerns. May consider a breathing test to rule out SIBO but due to financial concerns declined. Recommend a diet change, smoking cessation, fiber supplements, and Probiotics. 3.  Hyperplastic colon polyp recommend repeat colonoscopy in 5-10 years. 4.  The patient was encouraged to follow-up in 3 months. Total time:  25 minutes.

## 2022-02-16 PROBLEM — K63.5 HYPERPLASTIC POLYP OF SIGMOID COLON: Status: ACTIVE | Noted: 2022-01-28

## 2022-07-17 DIAGNOSIS — R10.13 EPIGASTRIC CRAMPING: ICD-10-CM

## 2022-07-17 DIAGNOSIS — R07.89 ATYPICAL CHEST PAIN: ICD-10-CM

## 2022-07-18 RX ORDER — OMEPRAZOLE 40 MG/1
CAPSULE, DELAYED RELEASE ORAL
Qty: 30 CAPSULE | Refills: 2 | Status: SHIPPED | OUTPATIENT
Start: 2022-07-18

## 2022-07-26 ENCOUNTER — TELEPHONE (OUTPATIENT)
Dept: FAMILY MEDICINE CLINIC | Age: 47
End: 2022-07-26

## 2022-07-26 NOTE — TELEPHONE ENCOUNTER
----- Message from Brenda Villegas sent at 7/26/2022  9:05 AM EDT -----  Subject: Appointment Request    Reason for Call: Established Patient Appointment needed: Routine Physical   Exam    QUESTIONS    Reason for appointment request? Available appointments did not meet   patient need     Additional Information for Provider? PT called in requesting an APPT for   August as he received a call last week to schedule, [facility] did not see   anything until October.  Please call him to schedule.   ---------------------------------------------------------------------------  --------------   [place] INFO  [phone]; OK to leave message on voicemail  ---------------------------------------------------------------------------  --------------  SCRIPT ANSWERS  COVID Screen: [facility] [place]

## 2022-11-03 ENCOUNTER — OFFICE VISIT (OUTPATIENT)
Dept: FAMILY MEDICINE CLINIC | Age: 47
End: 2022-11-03
Payer: COMMERCIAL

## 2022-11-03 VITALS
WEIGHT: 199 LBS | BODY MASS INDEX: 31.17 KG/M2 | SYSTOLIC BLOOD PRESSURE: 144 MMHG | OXYGEN SATURATION: 91 % | DIASTOLIC BLOOD PRESSURE: 72 MMHG | HEART RATE: 99 BPM

## 2022-11-03 DIAGNOSIS — R06.2 WHEEZING: ICD-10-CM

## 2022-11-03 DIAGNOSIS — R60.0 PEDAL EDEMA: ICD-10-CM

## 2022-11-03 DIAGNOSIS — R06.02 SHORTNESS OF BREATH: ICD-10-CM

## 2022-11-03 DIAGNOSIS — F10.20 ALCOHOLISM (HCC): Primary | ICD-10-CM

## 2022-11-03 DIAGNOSIS — R53.83 OTHER FATIGUE: ICD-10-CM

## 2022-11-03 PROCEDURE — 99214 OFFICE O/P EST MOD 30 MIN: CPT | Performed by: FAMILY MEDICINE

## 2022-11-03 SDOH — ECONOMIC STABILITY: FOOD INSECURITY: WITHIN THE PAST 12 MONTHS, THE FOOD YOU BOUGHT JUST DIDN'T LAST AND YOU DIDN'T HAVE MONEY TO GET MORE.: PATIENT DECLINED

## 2022-11-03 SDOH — ECONOMIC STABILITY: FOOD INSECURITY: WITHIN THE PAST 12 MONTHS, YOU WORRIED THAT YOUR FOOD WOULD RUN OUT BEFORE YOU GOT MONEY TO BUY MORE.: PATIENT DECLINED

## 2022-11-03 ASSESSMENT — SOCIAL DETERMINANTS OF HEALTH (SDOH): HOW HARD IS IT FOR YOU TO PAY FOR THE VERY BASICS LIKE FOOD, HOUSING, MEDICAL CARE, AND HEATING?: PATIENT DECLINED

## 2022-11-03 ASSESSMENT — PATIENT HEALTH QUESTIONNAIRE - PHQ9: DEPRESSION UNABLE TO ASSESS: PT REFUSES

## 2022-11-03 NOTE — PROGRESS NOTES
Chief Complaint   Patient presents with    Lata Tom has not been to see me in about 2 years, previously with gastritis and sent to gi, not seen by them either recently    Alcohol Problem     Today he admits to very heavy drinking, for many years, actually he is dozing on exam table when I enter. He reports drinking a bottle of liquor daily. Depression     Mood is quite depressed and he is seeing a counselor and [employer], that counselor is working on referring him to an alcohol recovery program but currently patient is still drinking heavily, he is requesting FMLA forms be completed for him to be off work while he works on this problem       [place] NARRATIVE:    Refused to do depression screening. Admits severe depression, but states already seeing therapist. Zenia Gilford on table but wakes up quickly. Eyes are severely bloodshot. Still drinking a bottle of liquor daily. Usually eats healthy dinner. Historically has cardiomyopathy and has had elevated sugar and blood pressure several times in the past.    Newer leg swelling right worse than left. States drinking protein shakes during the day. But his breakfast was a honey bun out of machine this am.  He is working a new job and worried if he misses significant time he will be fired. He is still in a probationary period. BP is moderately high this morning and weight is down slightly from previous. Patient is established unless otherwise noted. Above chief complaint and [place] obtained by this physician provider. Review of Systems   Constitutional:  Positive for fatigue. Negative for activity change, chills and fever. HENT:  Negative for congestion. Respiratory:  Positive for shortness of breath and wheezing. Negative for cough and chest tightness. Cardiovascular:  Positive for leg swelling. Negative for chest pain. Gastrointestinal:  Negative for abdominal pain. Musculoskeletal:  Negative for back pain and myalgias. Skin:  Negative for rash. Psychiatric/Behavioral:  Positive for dysphoric mood and sleep disturbance. Negative for behavioral problems. No Known Allergies  Allergy historyupdated. Outpatient Medications Marked as Taking for the 11/3/22 encounter (Office Visit) with Loanne Schwab, MD   Medication Sig Dispense Refill    omeprazole (PRILOSEC) 40 MG delayed release capsule TAKE 1 CAPSULE BY MOUTH EVERY DAY IN THE MORNING BEFORE BREAKFAST 30 capsule 2       Tobacco use history updated. Social History     Tobacco Use   Smoking Status Every Day    Packs/day: 1.00    Years: 25.00    Pack years: 25.00    Types: Cigarettes    Start date: 1/1/1990   Smokeless Tobacco Former    Types: Chew        Nursing note reviewed. Vitals:    11/03/22 1615   BP: (!) 144/72   Site: Left Upper Arm   Position: Sitting   Cuff Size: Medium Adult   Pulse: 99   SpO2: 91%   Weight: 199 lb (90.3 kg)          BP Readings from Last 3 Encounters:   11/15/22 122/84   11/03/22 (!) 144/72   02/14/22 136/84     Wt Readings from Last 3 Encounters:   11/15/22 181 lb (82.1 kg)   11/03/22 199 lb (90.3 kg)   02/14/22 202 lb 6.4 oz (91.8 kg)     Body mass index is 31.17 kg/m². No results found for this visit on 11/03/22. Physical Exam  Vitals and nursing note reviewed. Constitutional:       General: He is not in acute distress. Appearance: Normal appearance. He is well-developed. He is not diaphoretic. HENT:      Head: Normocephalic and atraumatic. Right Ear: External ear normal.      Left Ear: External ear normal.      Nose: Nose normal.      Mouth/Throat:      Mouth: Mucous membranes are moist.      Pharynx: Oropharynx is clear. No oropharyngeal exudate. Eyes:      General:         Right eye: No discharge. Left eye: No discharge. Conjunctiva/sclera: Conjunctivae normal.      Pupils: Pupils are equal, round, and reactive to light. Neck:      Thyroid: No thyromegaly.    Cardiovascular:      Rate and Rhythm: Normal rate and regular rhythm. Heart sounds: Normal heart sounds. No murmur heard. No gallop. Pulmonary:      Effort: Pulmonary effort is normal. No respiratory distress. Breath sounds: Normal breath sounds. No wheezing. Abdominal:      General: There is no distension. Palpations: Abdomen is soft. Musculoskeletal:         General: No swelling or deformity. Normal range of motion. Cervical back: Normal range of motion and neck supple. Right lower leg: No edema. Left lower leg: No edema. Lymphadenopathy:      Cervical: No cervical adenopathy. Skin:     General: Skin is warm and dry. Capillary Refill: Capillary refill takes less than 2 seconds. Coloration: Skin is not jaundiced. Findings: No erythema. Neurological:      General: No focal deficit present. Mental Status: He is alert and oriented to person, place, and time. Mental status is at baseline. Psychiatric:         Attention and Perception: Attention and perception normal.         Mood and Affect: Mood is anxious and depressed. Speech: Speech normal.         Behavior: Behavior normal.         Thought Content: Thought content normal.         Cognition and Memory: Cognition and memory normal.         Judgment: Judgment is inappropriate (Is not exhibiting good judgment). Comments: Patient was dozing when I entered the room, seems depressed and frustrated but poor insight and not an optimal commitment to stopping alcohol as he continues to drink         _________________________________________________  Assessment:     1. Alcoholism ([facility]  -     Vitamin B12 & Folate; Future  -     Hepatic Function Panel; Future  -     Hepatitis Panel, Acute; Future  2. Pedal edema  -     Renal Function Panel; Future  3. Other fatigue  -     CBC with Auto Differential; Future  -     TSH; Future  4. Shortness of breath  -     CBC with Auto Differential; Future  5.  Wheezing  -     XR CHEST STANDARD (2 VW); Future      So we had a long talk. If he is drinking this much alcohol he probably needs inpatient detox. He is advised to cut back by a quarter if he can. His current counselor is working on placement in a treatment program so I will not duplicate that work. We will get the labs noted as above including a chest x-ray given history of elevated blood pressure and possibly cardiomyopathy. Smoking cessation would of course be ideal as well. I declined to complete his work note at this time as he will need to have a significant intervention and begin to discontinue alcohol use before I will write him off work. If he goes into a treatment program they might be able to provide it as well. Both old and new problems are unstable. If he prefers and would like to act now he should probably go into an ER to be evaluated and get expedited referral into psychiatric care and detoxification situation. In primary care ambulatory practice we are not very able to get patients into this promptly, our current psychiatric providers who are part of [address] [place] [address] are shut down. See orders above and comments below for details of workup or medication orders. _________________________________________________  Plan:     Return in about 2 months (around 1/3/2023), or if symptoms worsen or fail to improve, for recheck after consults and psych help.       Electronically signed by Berna Ley MD on 11/3/22 at 4:33 PM EDT

## 2022-11-15 ENCOUNTER — OFFICE VISIT (OUTPATIENT)
Dept: FAMILY MEDICINE CLINIC | Age: 47
End: 2022-11-15

## 2022-11-15 VITALS
OXYGEN SATURATION: 96 % | BODY MASS INDEX: 28.35 KG/M2 | WEIGHT: 181 LBS | HEART RATE: 102 BPM | SYSTOLIC BLOOD PRESSURE: 122 MMHG | DIASTOLIC BLOOD PRESSURE: 84 MMHG

## 2022-11-15 DIAGNOSIS — F17.200 TOBACCO DEPENDENCE: ICD-10-CM

## 2022-11-15 DIAGNOSIS — E80.6 HYPERBILIRUBINEMIA: ICD-10-CM

## 2022-11-15 DIAGNOSIS — F41.1 GAD (GENERALIZED ANXIETY DISORDER): ICD-10-CM

## 2022-11-15 DIAGNOSIS — Z09 HOSPITAL DISCHARGE FOLLOW-UP: Primary | ICD-10-CM

## 2022-11-15 DIAGNOSIS — R06.02 SHORTNESS OF BREATH: ICD-10-CM

## 2022-11-15 DIAGNOSIS — F10.930 ALCOHOL WITHDRAWAL SYNDROME WITHOUT COMPLICATION (HCC): ICD-10-CM

## 2022-11-15 DIAGNOSIS — K70.10 ALCOHOLIC HEPATITIS, UNSPECIFIED WHETHER ASCITES PRESENT: ICD-10-CM

## 2022-11-15 DIAGNOSIS — J44.1 CHRONIC OBSTRUCTIVE PULMONARY DISEASE WITH ACUTE EXACERBATION (HCC): ICD-10-CM

## 2022-11-15 DIAGNOSIS — I42.9 CARDIOMYOPATHY, UNSPECIFIED TYPE (HCC): ICD-10-CM

## 2022-11-15 DIAGNOSIS — D75.89 MACROCYTOSIS WITHOUT ANEMIA: ICD-10-CM

## 2022-11-15 DIAGNOSIS — F10.20 ALCOHOLISM (HCC): ICD-10-CM

## 2022-11-15 RX ORDER — LORAZEPAM 0.5 MG/1
0.5 TABLET ORAL EVERY 8 HOURS PRN
Qty: 10 TABLET | Refills: 0 | Status: SHIPPED | OUTPATIENT
Start: 2022-11-15 | End: 2022-11-25

## 2022-11-15 RX ORDER — FLUTICASONE PROPIONATE AND SALMETEROL 500; 50 UG/1; UG/1
POWDER RESPIRATORY (INHALATION)
COMMUNITY
Start: 2022-11-14 | End: 2022-11-15

## 2022-11-15 RX ORDER — ALBUTEROL SULFATE 90 UG/1
1 AEROSOL, METERED RESPIRATORY (INHALATION) EVERY 6 HOURS PRN
COMMUNITY
Start: 2022-11-14 | End: 2022-12-14

## 2022-11-15 RX ORDER — LORAZEPAM 1 MG/1
1 TABLET ORAL EVERY 12 HOURS PRN
COMMUNITY
Start: 2022-11-14 | End: 2022-11-15

## 2022-11-15 RX ORDER — AMLODIPINE BESYLATE 5 MG/1
5 TABLET ORAL DAILY
Qty: 30 TABLET | Refills: 2 | Status: SHIPPED | OUTPATIENT
Start: 2022-11-15 | End: 2022-12-15

## 2022-11-15 RX ORDER — AMLODIPINE BESYLATE 5 MG/1
5 TABLET ORAL DAILY
COMMUNITY
Start: 2022-11-15 | End: 2022-11-15 | Stop reason: SDUPTHER

## 2022-11-15 RX ORDER — NEBULIZER ACCESSORIES
1 KIT MISCELLANEOUS DAILY PRN
Qty: 1 KIT | Refills: 5 | Status: SHIPPED | OUTPATIENT
Start: 2022-11-15

## 2022-11-15 RX ORDER — IPRATROPIUM BROMIDE AND ALBUTEROL SULFATE 2.5; .5 MG/3ML; MG/3ML
3 SOLUTION RESPIRATORY (INHALATION) EVERY 6 HOURS PRN
COMMUNITY
Start: 2022-11-14 | End: 2022-12-14

## 2022-11-15 RX ORDER — NICOTINE 21 MG/24HR
1 PATCH, TRANSDERMAL 24 HOURS TRANSDERMAL NIGHTLY
COMMUNITY
Start: 2022-11-14 | End: 2022-12-14

## 2022-11-15 RX ORDER — FAMOTIDINE 40 MG/1
40 TABLET, FILM COATED ORAL EVERY EVENING
Qty: 30 TABLET | Refills: 3 | Status: SHIPPED | OUTPATIENT
Start: 2022-11-15 | End: 2022-11-15

## 2022-11-15 RX ORDER — LANOLIN ALCOHOL/MO/W.PET/CERES
100 CREAM (GRAM) TOPICAL DAILY
COMMUNITY
Start: 2022-11-15 | End: 2022-12-15

## 2022-11-15 RX ORDER — PREDNISONE 20 MG/1
40 TABLET ORAL DAILY
COMMUNITY
Start: 2022-11-15 | End: 2022-11-18

## 2022-11-15 NOTE — PROGRESS NOTES
Post-Discharge Transitional Care Management Progress Note      Kayode Soto   YOB: 1975    Date of Office Visit:  11/15/2022  Date of Hospital Admission: 11/11/2022 to [facility]  Date of Hospital Discharge: 11/14/2022 (yesterday)    Care management risk score Rising risk (score 2-5) and Complex Care (Scores >=6): No Risk Score On File     Non face to face  following discharge, date last encounter closed (first attempt may have been earlier): Patient is being seen within 2 days of discharge so no phone call is required    Call initiated 2 business days of discharge: NA    ASSESSMENT/PLAN:   Hospital discharge follow-up  -     MD DISCHARGE MEDS RECONCILED W/ CURRENT OUTPATIENT MED LIST  Shortness of breath  Tobacco dependence  Alcoholism ([facility] [address]  Chronic obstructive pulmonary disease with acute exacerbation ([address]  Cardiomyopathy, unspecified type ([address]  Macrocytosis without anemia  Hyperbilirubinemia  JULI (generalized anxiety disorder)  The following orders have not been finalized:  -     LORazepam (ATIVAN) 0.5 MG tablet  Alcohol withdrawal syndrome without complication ([address]  The following orders have not been finalized:  -     LORazepam (ATIVAN) 0.5 MG tablet    Medical Decision Making: high complexity  Return in about 4 weeks (around 12/13/2022), or if symptoms worsen or fail to improve. On this date 11/15/2022 I have spent 45 minutes reviewing previous notes, test results and face to face with the patient discussing the diagnosis and importance of compliance with the treatment plan as well as documenting on the day of the visit. A work note was provided. I saw him for the first time on 11/3/2022. He states he became ill and left work on the afternoon of 11/9/2022. He presented to the emergency room with acute alcoholic intoxication and positive blood alcohol level, COPD symptoms with abnormal lung exam on 11/11/2022.   He was admitted, put on CIWA protocol, had work-up and therapy and was discharged home on 11/14/2022, that was yesterday. I did give him a letter with dates as above and we did receive incoming [place] paperwork and short-term disability paperwork earlier. That was actually completed today with similar dates as above and submitted either late today or early tomorrow to the main [employer] number. He is to return to work on 12/19/2022, that some Monday. We anticipate he can return without restrictions. But we can reassess if he is not doing well. He must begin attending a sober living support group if we are to support further time off work. Subjective:   HPI:  Follow up of Hospital problems/diagnosis(es): Patient was admitted for acute alcohol intoxication with desire to detox, COPD exacerbation, liver steatosis with likely alcoholic hepatitis. He also has tobacco dependence and a history of cardiomyopathy with abnormal imaging. Inpatient course: Discharge summary reviewed- see chart. Patient states he has not had any alcohol since discharge from the hospital.  He denies any symptoms at this time of alcohol withdrawal.  He has not smoked cigarettes but has used a vape pen just a few times. He has not returned to work and is very interested in getting paperwork to be off work today. He is seeing a counselor in the [employer] [facility] affiliated with [facility]. He was provided multiple resources and directed to AA or peer support organization for alcohol withdrawal or to community mental health. He states he did not receive this referral but I took his after visit summary from him and showed him all the phone numbers on the last page. He is very strongly advised to contact 1 of those organizationsfor assistance in attaining sobriety and maintaining the same. States he is supposed to see cardiology. But he does not have a name or number. His discharge paperwork does say to see pulmonologist, Dr. Charlee Sales. This for COPD.   He was not directed to follow-up with hepatology but those are considerations. He likely has anxiety and depression which will be unmasked as he discontinues alcohol use. He states today he will not take any antidepressants. He was given a few Ativan 1 mg at discharge and has use those sparingly. I will give him a few more today but cannot continue this long-term and he is aware. He was unable to get Wixela inhaler they prescribed his discharge due to expense, he was given medicine for a nebulizer but no nebulizer tubing nor nebulizer compressor prescription. He is taking the omeprazole but does not know the name of it. He is taking amlodipine. He is taking prednisone until it runs out. He was given a prescription for NicoDerm as well and is wearing it. Sees [facility] today at 5 with a plan. Interval history/Current status: Improving and feeling well, states his tongue is no longer discolored. Abdominal swelling has decreased. Patient Active Problem List   Diagnosis    Low back pain    Cardiomyopathy ([address]    Alcoholism ([facility] [address] [address]    Metatarsalgia of left foot    Epigastric pain    Gastritis without bleeding    Hyperplastic polyp of sigmoid colon    Hyperbilirubinemia    Macrocytosis without anemia    Chronic obstructive pulmonary disease with acute exacerbation (HCC)       Medications listed as ordered at the time of discharge from hospital     Medication List            Accurate as of November 15, 2022  3:20 PM. If you have any questions, ask your nurse or doctor.                 START taking these medications      Compressor/Nebulizer Misc  1 each by Does not apply route daily as needed (shortness of breath)  Started by: Anand Smart MD     Nebulizer/Tubing/Mouthpiece Kit  1 kit by Does not apply route daily as needed (cough or shortness of breath)  Started by: Anand Smart MD            CONTINUE taking these medications      albuterol sulfate  (90 Base) MCG/ACT inhaler  Commonly known as: PROVENTIL;VENTOLIN;PROAIR     amLODIPine 5 MG tablet  Commonly known as: NORVASC     ipratropium-albuterol 0.5-2.5 (3) MG/3ML Soln nebulizer solution  Commonly known as: DUONEB     LORazepam 1 MG tablet  Commonly known as: ATIVAN     nicotine 21 MG/24HR  Commonly known as: NICODERM CQ     omeprazole 40 MG delayed release capsule  Commonly known as: PRILOSEC  TAKE 1 CAPSULE BY MOUTH EVERY DAY IN THE MORNING BEFORE BREAKFAST     predniSONE 20 MG tablet  Commonly known as: DELTASONE     thiamine 100 MG tablet            STOP taking these medications      Wixela Inhub 500-50 MCG/ACT Aepb diskus inhaler  Generic drug: fluticasone-salmeterol  Stopped by: Keily Thurman MD               Where to Get Your Medications        You can get these medications from any pharmacy    Bring a paper prescription for each of these medications  Compressor/Nebulizer Misc  Nebulizer/Tubing/Mouthpiece Kit           Medications marked \"taking\" at this time  Outpatient Medications Marked as Taking for the 11/15/22 encounter (Office Visit) with Keily Thurman MD   Medication Sig Dispense Refill    albuterol sulfate HFA (PROVENTIL;VENTOLIN;PROAIR) 108 (90 Base) MCG/ACT inhaler Inhale 1 puff into the lungs every 6 hours as needed      amLODIPine (NORVASC) 5 MG tablet Take 5 mg by mouth daily      LORazepam (ATIVAN) 1 MG tablet Take 1 mg by mouth every 12 hours as needed.       nicotine (NICODERM CQ) 21 MG/24HR Place 1 patch onto the skin nightly      predniSONE (DELTASONE) 20 MG tablet Take 40 mg by mouth daily      thiamine 100 MG tablet Take 100 mg by mouth daily      Nebulizers (COMPRESSOR/NEBULIZER) MISC 1 each by Does not apply route daily as needed (shortness of breath) 1 each 3    Respiratory Therapy Supplies (NEBULIZER/TUBING/MOUTHPIECE) KIT 1 kit by Does not apply route daily as needed (cough or shortness of breath) 1 kit 5    omeprazole (PRILOSEC) 40 MG delayed release capsule TAKE 1 CAPSULE BY MOUTH EVERY DAY IN THE MORNING BEFORE BREAKFAST 30 capsule 2        Medications patient taking as of now reconciled against medications ordered at time of hospital discharge: Yes    A comprehensive review of systems was negative except for what was noted in the HPI. Objective:    /84 (Site: Left Upper Arm, Position: Sitting, Cuff Size: Medium Adult)   Pulse (!) 102   Wt 181 lb (82.1 kg)   SpO2 96%   BMI 28.35 kg/m²   General Appearance: alert and oriented to person, place and time, well-developed and well-nourished, in no acute distress. Physical Exam  Vitals and nursing note reviewed. Constitutional:       General: He is not in acute distress. Appearance: He is well-developed. He is not diaphoretic. HENT:      Head: Normocephalic and atraumatic. Nose: Nose normal.   Eyes:      Conjunctiva/sclera: Conjunctivae normal.      Pupils: Pupils are equal, round, and reactive to light. Cardiovascular:      Rate and Rhythm: Normal rate and regular rhythm. Heart sounds: Normal heart sounds. No murmur heard. No friction rub. Pulmonary:      Effort: Pulmonary effort is normal. No respiratory distress. Breath sounds: Normal breath sounds. No wheezing. Musculoskeletal:         General: Normal range of motion. Cervical back: Normal range of motion and neck supple. Skin:     General: Skin is warm and dry. Coloration: Skin is not jaundiced. Neurological:      Mental Status: He is alert and oriented to person, place, and time. Psychiatric:         Attention and Perception: Attention and perception normal.         Mood and Affect: Mood is anxious and depressed. Speech: Speech normal.         Behavior: Behavior normal.         Thought Content: Thought content normal.         Cognition and Memory: Cognition and memory normal.         Judgment: Judgment normal.        An electronic signature was used to authenticate this note.   --Crescencio Mejía MD

## 2022-11-15 NOTE — LETTER
[address] [facility]  [address] [facility],[address] [facility]  Phone: [phone]  Fax: [phone]    Andrew Velazquez MD        November 15, 2022     Patient: Roselyn Hamilton   YOB: 1975   Date of Visit: 11/15/2022       To Whom it May Concern:    Jayy Penny was seen in my clinic on 11/15/2022. Please excuse his work absence beginning Wednesday afternoon 11/9/2022. He will be off work for a month days, with return to work planned for 12/19/2022. If you have any questions or concerns, please don't hesitate to call.     Sincerely,         Andrew Velazquez MD

## 2022-11-26 ASSESSMENT — ENCOUNTER SYMPTOMS
SHORTNESS OF BREATH: 1
COUGH: 0
CHEST TIGHTNESS: 0
BACK PAIN: 0
ABDOMINAL PAIN: 0
WHEEZING: 1

## 2022-12-15 RX ORDER — LANOLIN ALCOHOL/MO/W.PET/CERES
100 CREAM (GRAM) TOPICAL DAILY
Qty: 30 TABLET | Refills: 5 | Status: SHIPPED | OUTPATIENT
Start: 2022-12-15 | End: 2023-01-14

## 2022-12-15 RX ORDER — ERGOCALCIFEROL 1.25 MG/1
50000 CAPSULE ORAL WEEKLY
Qty: 4 CAPSULE | Refills: 0 | Status: SHIPPED | OUTPATIENT
Start: 2022-12-15

## 2022-12-15 NOTE — TELEPHONE ENCOUNTER
Patient has not been prescribed vitamin D nor does he show any labs with vitamin D deficiency. We will send 1 month only.

## 2022-12-27 ENCOUNTER — OFFICE VISIT (OUTPATIENT)
Dept: FAMILY MEDICINE CLINIC | Age: 47
End: 2022-12-27
Payer: COMMERCIAL

## 2022-12-27 VITALS
SYSTOLIC BLOOD PRESSURE: 122 MMHG | BODY MASS INDEX: 30.85 KG/M2 | HEART RATE: 91 BPM | WEIGHT: 197 LBS | OXYGEN SATURATION: 93 % | DIASTOLIC BLOOD PRESSURE: 82 MMHG

## 2022-12-27 DIAGNOSIS — F17.200 TOBACCO DEPENDENCE: ICD-10-CM

## 2022-12-27 DIAGNOSIS — I42.9 CARDIOMYOPATHY, UNSPECIFIED TYPE (HCC): ICD-10-CM

## 2022-12-27 DIAGNOSIS — F41.1 GAD (GENERALIZED ANXIETY DISORDER): Primary | ICD-10-CM

## 2022-12-27 DIAGNOSIS — I10 PRIMARY HYPERTENSION: ICD-10-CM

## 2022-12-27 DIAGNOSIS — J44.1 CHRONIC OBSTRUCTIVE PULMONARY DISEASE WITH ACUTE EXACERBATION (HCC): ICD-10-CM

## 2022-12-27 DIAGNOSIS — F10.20 ALCOHOLISM (HCC): ICD-10-CM

## 2022-12-27 PROCEDURE — 3074F SYST BP LT 130 MM HG: CPT | Performed by: FAMILY MEDICINE

## 2022-12-27 PROCEDURE — 3078F DIAST BP <80 MM HG: CPT | Performed by: FAMILY MEDICINE

## 2022-12-27 PROCEDURE — 99214 OFFICE O/P EST MOD 30 MIN: CPT | Performed by: FAMILY MEDICINE

## 2022-12-27 RX ORDER — LANOLIN ALCOHOL/MO/W.PET/CERES
100 CREAM (GRAM) TOPICAL DAILY
Qty: 30 TABLET | Refills: 5 | Status: SHIPPED | OUTPATIENT
Start: 2022-12-27 | End: 2023-01-26

## 2022-12-27 RX ORDER — AMLODIPINE BESYLATE 5 MG/1
5 TABLET ORAL DAILY
Qty: 30 TABLET | Refills: 5 | Status: SHIPPED | OUTPATIENT
Start: 2022-12-27 | End: 2023-01-26

## 2022-12-27 RX ORDER — ERGOCALCIFEROL 1.25 MG/1
50000 CAPSULE ORAL WEEKLY
Qty: 4 CAPSULE | Refills: 5 | Status: SHIPPED | OUTPATIENT
Start: 2022-12-27

## 2022-12-27 NOTE — PROGRESS NOTES
Chief Complaint   Patient presents with    Follow-up     2 month --etoh issues and mood    Hypertension     BP much better on medication    COPD     No longer on inhaler, still smoking       [place] NARRATIVE:    Still no alcohol at all he says. Back at work. Continues with counselor and [facility]ALL[facility] who he says is no help but then says that that person recommended some books and also for him to attend AA. He has not followed through on those suggestions. He did attend AA once but is not doing so regularly. He continues to smoke cigarettes. Out of one of his vitamins he does not know which. BP good control and he seems less anxious and depressed today. Weight is up and he reports eating significantly better. Patient is established unless otherwise noted. Above chief complaint and [place] obtained by this physician provider. Review of Systems   Constitutional:  Negative for activity change, chills, fatigue and fever. HENT:  Negative for congestion. Respiratory:  Negative for cough, chest tightness, shortness of breath and wheezing. Cardiovascular:  Negative for chest pain and leg swelling. Gastrointestinal:  Negative for abdominal pain. Musculoskeletal:  Negative for back pain and myalgias. Skin:  Negative for rash. Psychiatric/Behavioral:  Negative for behavioral problems and dysphoric mood. No Known Allergies  Allergy historyupdated.     Outpatient Medications Marked as Taking for the 12/27/22 encounter (Office Visit) with Loanne Schwab, MD   Medication Sig Dispense Refill    amLODIPine (NORVASC) 5 MG tablet Take 1 tablet by mouth daily 30 tablet 5    thiamine 100 MG tablet Take 1 tablet by mouth daily 30 tablet 5    vitamin D (ERGOCALCIFEROL) 1.25 MG (06772 UT) CAPS capsule Take 1 capsule by mouth once a week 4 capsule 5    omeprazole (PRILOSEC) 40 MG delayed release capsule TAKE 1 CAPSULE BY MOUTH EVERY DAY IN THE MORNING BEFORE BREAKFAST 30 capsule 2       Tobacco use history updated. Social History     Tobacco Use   Smoking Status Every Day    Packs/day: 1.00    Years: 25.00    Pack years: 25.00    Types: Cigarettes    Start date: 1/1/1990   Smokeless Tobacco Former    Types: Chew        Nursing note reviewed. Vitals:    12/27/22 1358   BP: 122/82   Site: Left Upper Arm   Position: Sitting   Cuff Size: Medium Adult   Pulse: 91   SpO2: 93%   Weight: 197 lb (89.4 kg)          BP Readings from Last 3 Encounters:   12/27/22 122/82   11/15/22 122/84   11/03/22 (!) 144/72     Wt Readings from Last 3 Encounters:   12/27/22 197 lb (89.4 kg)   11/15/22 181 lb (82.1 kg)   11/03/22 199 lb (90.3 kg)     Body mass index is 30.85 kg/m². No results found for this visit on 12/27/22. Physical Exam  Vitals and nursing note reviewed. Constitutional:       General: He is not in acute distress. Appearance: He is well-developed. He is not diaphoretic. HENT:      Head: Normocephalic and atraumatic. Nose: Nose normal.   Eyes:      Conjunctiva/sclera: Conjunctivae normal.      Pupils: Pupils are equal, round, and reactive to light. Cardiovascular:      Rate and Rhythm: Normal rate and regular rhythm. Heart sounds: Normal heart sounds. No murmur heard. No friction rub. Pulmonary:      Effort: Pulmonary effort is normal. No respiratory distress. Breath sounds: Normal breath sounds. No wheezing. Musculoskeletal:         General: Normal range of motion. Cervical back: Normal range of motion and neck supple. Skin:     General: Skin is warm and dry. Coloration: Skin is not jaundiced. Neurological:      Mental Status: He is alert and oriented to person, place, and time. Psychiatric:         Mood and Affect: Mood normal.         Behavior: Behavior normal.         Thought Content: Thought content normal.         _________________________________________________  Assessment:     1. JULI (generalized anxiety disorder)  2. Tobacco dependence  3.  Cardiomyopathy, unspecified type ([facility]  4. Chronic obstructive pulmonary disease with acute exacerbation ([facility]  5. Primary hypertension  -     amLODIPine (NORVASC) 5 MG tablet; Take 1 tablet by mouth daily, Disp-30 tablet, R-5Normal  6. Alcoholism ([facility]  -     thiamine 100 MG tablet; Take 1 tablet by mouth daily, Disp-30 tablet, R-5Print  -     vitamin D (ERGOCALCIFEROL) 1.25 MG (59629 UT) CAPS capsule; Take 1 capsule by mouth once a week, Disp-4 capsule, R-5Print    Problems listed above are stable and therapeutic plan is unchanged unless otherwise specified. I am not convinced he is 100% abstaining from alcohol. He is not willing to access other areas of support or follow-up and declines any medication from me today. I did refill his vitamins as I think he should stay on those moving forward. They will not be harmful. His blood pressure medication also appears to be working well without side effects so it was renewed. Smoking cessation is suggested. See orders above and comments below for details of workup or medication orders. _________________________________________________  Plan:     Declines any referrals today, wants to try on his own. He also declines wellbutrin. Seeing counsellor at [facility] was referred to paul and to [facility] he went once. Return in about 3 months (around 3/27/2023), or if symptoms worsen or fail to improve, for pv htn with fbw.       Electronically signed by Lalo Mercado MD on 12/27/22 at 2:17 PM EST

## 2022-12-28 ASSESSMENT — ENCOUNTER SYMPTOMS
ABDOMINAL PAIN: 0
CHEST TIGHTNESS: 0
SHORTNESS OF BREATH: 0
WHEEZING: 0
BACK PAIN: 0
COUGH: 0

## 2022-12-28 ASSESSMENT — PATIENT HEALTH QUESTIONNAIRE - PHQ9
6. FEELING BAD ABOUT YOURSELF - OR THAT YOU ARE A FAILURE OR HAVE LET YOURSELF OR YOUR FAMILY DOWN: 0
7. TROUBLE CONCENTRATING ON THINGS, SUCH AS READING THE NEWSPAPER OR WATCHING TELEVISION: 0
10. IF YOU CHECKED OFF ANY PROBLEMS, HOW DIFFICULT HAVE THESE PROBLEMS MADE IT FOR YOU TO DO YOUR WORK, TAKE CARE OF THINGS AT HOME, OR GET ALONG WITH OTHER PEOPLE: 0
SUM OF ALL RESPONSES TO PHQ QUESTIONS 1-9: 1
8. MOVING OR SPEAKING SO SLOWLY THAT OTHER PEOPLE COULD HAVE NOTICED. OR THE OPPOSITE, BEING SO FIGETY OR RESTLESS THAT YOU HAVE BEEN MOVING AROUND A LOT MORE THAN USUAL: 0
4. FEELING TIRED OR HAVING LITTLE ENERGY: 0
3. TROUBLE FALLING OR STAYING ASLEEP: 0
SUM OF ALL RESPONSES TO PHQ QUESTIONS 1-9: 1
SUM OF ALL RESPONSES TO PHQ9 QUESTIONS 1 & 2: 1
1. LITTLE INTEREST OR PLEASURE IN DOING THINGS: 0
SUM OF ALL RESPONSES TO PHQ QUESTIONS 1-9: 1
2. FEELING DOWN, DEPRESSED OR HOPELESS: 1
9. THOUGHTS THAT YOU WOULD BE BETTER OFF DEAD, OR OF HURTING YOURSELF: 0
5. POOR APPETITE OR OVEREATING: 0
SUM OF ALL RESPONSES TO PHQ QUESTIONS 1-9: 1

## 2023-01-09 DIAGNOSIS — R07.89 ATYPICAL CHEST PAIN: ICD-10-CM

## 2023-01-09 DIAGNOSIS — R10.13 EPIGASTRIC CRAMPING: ICD-10-CM

## 2023-01-09 RX ORDER — OMEPRAZOLE 40 MG/1
CAPSULE, DELAYED RELEASE ORAL
Qty: 30 CAPSULE | Refills: 2 | Status: SHIPPED | OUTPATIENT
Start: 2023-01-09

## 2023-03-19 DIAGNOSIS — F10.20 ALCOHOLISM (HCC): ICD-10-CM

## 2023-03-20 RX ORDER — ERGOCALCIFEROL 1.25 MG/1
CAPSULE ORAL
Qty: 4 CAPSULE | Refills: 5 | Status: SHIPPED | OUTPATIENT
Start: 2023-03-20

## 2023-04-04 ENCOUNTER — OFFICE VISIT (OUTPATIENT)
Dept: FAMILY MEDICINE CLINIC | Age: 48
End: 2023-04-04

## 2023-04-04 VITALS
DIASTOLIC BLOOD PRESSURE: 78 MMHG | WEIGHT: 195.4 LBS | HEART RATE: 81 BPM | OXYGEN SATURATION: 92 % | SYSTOLIC BLOOD PRESSURE: 100 MMHG | BODY MASS INDEX: 30.6 KG/M2

## 2023-04-04 DIAGNOSIS — D75.89 MACROCYTOSIS WITHOUT ANEMIA: ICD-10-CM

## 2023-04-04 DIAGNOSIS — I10 PRIMARY HYPERTENSION: ICD-10-CM

## 2023-04-04 DIAGNOSIS — M25.511 CHRONIC PAIN OF BOTH SHOULDERS: ICD-10-CM

## 2023-04-04 DIAGNOSIS — G89.29 CHRONIC PAIN OF BOTH SHOULDERS: ICD-10-CM

## 2023-04-04 DIAGNOSIS — R06.02 SHORTNESS OF BREATH: ICD-10-CM

## 2023-04-04 DIAGNOSIS — F10.20 ALCOHOLISM (HCC): Primary | ICD-10-CM

## 2023-04-04 DIAGNOSIS — R53.83 OTHER FATIGUE: ICD-10-CM

## 2023-04-04 DIAGNOSIS — F17.200 TOBACCO DEPENDENCE: ICD-10-CM

## 2023-04-04 DIAGNOSIS — G62.9 NEUROPATHY: ICD-10-CM

## 2023-04-04 DIAGNOSIS — M25.512 CHRONIC PAIN OF BOTH SHOULDERS: ICD-10-CM

## 2023-04-04 DIAGNOSIS — I42.9 CARDIOMYOPATHY, UNSPECIFIED TYPE (HCC): ICD-10-CM

## 2023-04-04 DIAGNOSIS — E55.9 VITAMIN D DEFICIENCY: ICD-10-CM

## 2023-04-04 DIAGNOSIS — R60.0 PEDAL EDEMA: ICD-10-CM

## 2023-04-04 LAB
25(OH)D3 SERPL-MCNC: 32.1 NG/ML
CHOLEST SERPL-MCNC: 189 MG/DL (ref 0–199)
DEPRECATED RDW RBC AUTO: 13.1 % (ref 12.4–15.4)
HCT VFR BLD AUTO: 49.2 % (ref 40.5–52.5)
HDLC SERPL-MCNC: 47 MG/DL (ref 40–60)
HGB BLD-MCNC: 16.6 G/DL (ref 13.5–17.5)
LDLC SERPL CALC-MCNC: 105 MG/DL
MCH RBC QN AUTO: 32.6 PG (ref 26–34)
MCHC RBC AUTO-ENTMCNC: 33.7 G/DL (ref 31–36)
MCV RBC AUTO: 96.6 FL (ref 80–100)
PLATELET # BLD AUTO: 211 K/UL (ref 135–450)
PMV BLD AUTO: 8.7 FL (ref 5–10.5)
RBC # BLD AUTO: 5.1 M/UL (ref 4.2–5.9)
TRIGL SERPL-MCNC: 185 MG/DL (ref 0–150)
VLDLC SERPL CALC-MCNC: 37 MG/DL
WBC # BLD AUTO: 5.1 K/UL (ref 4–11)

## 2023-04-04 PROCEDURE — 3074F SYST BP LT 130 MM HG: CPT | Performed by: FAMILY MEDICINE

## 2023-04-04 PROCEDURE — 99214 OFFICE O/P EST MOD 30 MIN: CPT | Performed by: FAMILY MEDICINE

## 2023-04-04 PROCEDURE — 3078F DIAST BP <80 MM HG: CPT | Performed by: FAMILY MEDICINE

## 2023-04-04 RX ORDER — AMLODIPINE BESYLATE 5 MG/1
5 TABLET ORAL DAILY
Qty: 30 TABLET | Refills: 5 | Status: SHIPPED | OUTPATIENT
Start: 2023-04-04 | End: 2023-05-04

## 2023-04-04 RX ORDER — ERGOCALCIFEROL 1.25 MG/1
CAPSULE ORAL
Qty: 4 CAPSULE | Refills: 5 | Status: SHIPPED | OUTPATIENT
Start: 2023-04-04

## 2023-04-04 RX ORDER — MELOXICAM 15 MG/1
15 TABLET ORAL DAILY
Qty: 30 TABLET | Refills: 2 | Status: SHIPPED | OUTPATIENT
Start: 2023-04-04

## 2023-04-04 RX ORDER — CYCLOBENZAPRINE HCL 10 MG
10 TABLET ORAL NIGHTLY PRN
Qty: 30 TABLET | Refills: 1 | Status: SHIPPED | OUTPATIENT
Start: 2023-04-04 | End: 2023-04-14

## 2023-04-04 ASSESSMENT — PATIENT HEALTH QUESTIONNAIRE - PHQ9
2. FEELING DOWN, DEPRESSED OR HOPELESS: 1
SUM OF ALL RESPONSES TO PHQ QUESTIONS 1-9: 2
SUM OF ALL RESPONSES TO PHQ9 QUESTIONS 1 & 2: 2
1. LITTLE INTEREST OR PLEASURE IN DOING THINGS: 1
SUM OF ALL RESPONSES TO PHQ QUESTIONS 1-9: 2

## 2023-05-01 ASSESSMENT — ENCOUNTER SYMPTOMS
ABDOMINAL PAIN: 0
COUGH: 0
WHEEZING: 0
CHEST TIGHTNESS: 0
BACK PAIN: 0
SHORTNESS OF BREATH: 0

## 2023-07-07 ENCOUNTER — OFFICE VISIT (OUTPATIENT)
Dept: FAMILY MEDICINE CLINIC | Age: 48
End: 2023-07-07
Payer: COMMERCIAL

## 2023-07-07 VITALS
HEART RATE: 98 BPM | WEIGHT: 201 LBS | OXYGEN SATURATION: 93 % | SYSTOLIC BLOOD PRESSURE: 122 MMHG | BODY MASS INDEX: 31.48 KG/M2 | DIASTOLIC BLOOD PRESSURE: 82 MMHG

## 2023-07-07 DIAGNOSIS — I10 PRIMARY HYPERTENSION: ICD-10-CM

## 2023-07-07 DIAGNOSIS — J44.1 CHRONIC OBSTRUCTIVE PULMONARY DISEASE WITH ACUTE EXACERBATION (HCC): ICD-10-CM

## 2023-07-07 DIAGNOSIS — F10.20 ALCOHOLISM (HCC): Primary | ICD-10-CM

## 2023-07-07 DIAGNOSIS — F41.0 PANIC ATTACKS: ICD-10-CM

## 2023-07-07 DIAGNOSIS — F17.200 TOBACCO DEPENDENCE: ICD-10-CM

## 2023-07-07 PROCEDURE — 3074F SYST BP LT 130 MM HG: CPT | Performed by: FAMILY MEDICINE

## 2023-07-07 PROCEDURE — 99214 OFFICE O/P EST MOD 30 MIN: CPT | Performed by: FAMILY MEDICINE

## 2023-07-07 PROCEDURE — 3078F DIAST BP <80 MM HG: CPT | Performed by: FAMILY MEDICINE

## 2023-07-07 RX ORDER — HYDROXYZINE 50 MG/1
50 TABLET, FILM COATED ORAL EVERY 8 HOURS PRN
Qty: 45 TABLET | Refills: 2 | Status: SHIPPED | OUTPATIENT
Start: 2023-07-07 | End: 2023-07-22

## 2023-07-07 RX ORDER — CYCLOBENZAPRINE HCL 10 MG
10 TABLET ORAL NIGHTLY PRN
COMMUNITY
Start: 2023-06-13

## 2023-07-07 SDOH — ECONOMIC STABILITY: FOOD INSECURITY: WITHIN THE PAST 12 MONTHS, YOU WORRIED THAT YOUR FOOD WOULD RUN OUT BEFORE YOU GOT MONEY TO BUY MORE.: PATIENT DECLINED

## 2023-07-07 SDOH — ECONOMIC STABILITY: FOOD INSECURITY: WITHIN THE PAST 12 MONTHS, THE FOOD YOU BOUGHT JUST DIDN'T LAST AND YOU DIDN'T HAVE MONEY TO GET MORE.: PATIENT DECLINED

## 2023-07-07 SDOH — ECONOMIC STABILITY: INCOME INSECURITY: HOW HARD IS IT FOR YOU TO PAY FOR THE VERY BASICS LIKE FOOD, HOUSING, MEDICAL CARE, AND HEATING?: PATIENT DECLINED

## 2023-07-07 SDOH — ECONOMIC STABILITY: HOUSING INSECURITY
IN THE LAST 12 MONTHS, WAS THERE A TIME WHEN YOU DID NOT HAVE A STEADY PLACE TO SLEEP OR SLEPT IN A SHELTER (INCLUDING NOW)?: PATIENT REFUSED

## 2023-07-12 ASSESSMENT — ENCOUNTER SYMPTOMS
ABDOMINAL PAIN: 0
BACK PAIN: 0
COUGH: 0
WHEEZING: 0
SHORTNESS OF BREATH: 0
CHEST TIGHTNESS: 0

## 2023-08-22 ENCOUNTER — OFFICE VISIT (OUTPATIENT)
Dept: FAMILY MEDICINE CLINIC | Age: 48
End: 2023-08-22
Payer: COMMERCIAL

## 2023-08-22 VITALS
WEIGHT: 218 LBS | SYSTOLIC BLOOD PRESSURE: 122 MMHG | BODY MASS INDEX: 34.14 KG/M2 | DIASTOLIC BLOOD PRESSURE: 82 MMHG | HEART RATE: 97 BPM | OXYGEN SATURATION: 89 %

## 2023-08-22 DIAGNOSIS — Z86.79 HX OF CARDIOMYOPATHY: ICD-10-CM

## 2023-08-22 DIAGNOSIS — R60.0 PEDAL EDEMA: ICD-10-CM

## 2023-08-22 DIAGNOSIS — F10.20 ALCOHOLISM (HCC): Primary | ICD-10-CM

## 2023-08-22 DIAGNOSIS — R14.0 ABDOMINAL DISTENSION: ICD-10-CM

## 2023-08-22 DIAGNOSIS — F17.200 TOBACCO DEPENDENCE: ICD-10-CM

## 2023-08-22 DIAGNOSIS — I10 PRIMARY HYPERTENSION: ICD-10-CM

## 2023-08-22 DIAGNOSIS — R06.09 DOE (DYSPNEA ON EXERTION): ICD-10-CM

## 2023-08-22 PROCEDURE — 99214 OFFICE O/P EST MOD 30 MIN: CPT | Performed by: FAMILY MEDICINE

## 2023-08-22 PROCEDURE — 3078F DIAST BP <80 MM HG: CPT | Performed by: FAMILY MEDICINE

## 2023-08-22 PROCEDURE — 3074F SYST BP LT 130 MM HG: CPT | Performed by: FAMILY MEDICINE

## 2023-08-22 RX ORDER — AMLODIPINE BESYLATE 5 MG/1
5 TABLET ORAL DAILY
Qty: 30 TABLET | Refills: 5 | Status: SHIPPED | OUTPATIENT
Start: 2023-08-22 | End: 2024-02-18

## 2023-08-22 NOTE — PROGRESS NOTES
Chief Complaint   Patient presents with    Follow-up     On multiple issues, some better and some worse    Swelling     Bilateral leg swelling pt reports ongoing issue worsening recently -- also fatigue with exertion and sob, with specter of old \"cardiomyopathy\" dx (echo's in recent years were ok)    Other     Pt c/o pressure in head and eyes upon bending over       Alcohol Problem     Still drinking, still having some marital conflict, seeing counsellor       [place] NARRATIVE:    He is on Norvasc, he is not on meloxicam.  He is no longer on Tegretol or clonidine or Flexeril. Reports that he has alcohol \"almost cut completely out\" -- reports he is about to go to  and also quit smoking. Has a therapist in [address] but hs not seen them \"for a while. \"  Is working. [employer] with extra yard work and belly fullness. Alarming for cardiomyopathy hx and possible alcoholic cardiomyopathy development. Patient is established unless otherwise noted. Above chief complaint and [place] obtained by this physician provider. Review of Systems   Constitutional:  Negative for activity change, chills, fatigue and fever. HENT:  Negative for congestion. Respiratory:  Positive for shortness of breath. Negative for cough, chest tightness and wheezing. Cardiovascular:  Negative for chest pain and leg swelling. Gastrointestinal:  Negative for abdominal pain. Musculoskeletal:  Negative for myalgias. Skin:  Negative for rash. Neurological:  Positive for headaches. Psychiatric/Behavioral:  Positive for dysphoric mood and sleep disturbance. Negative for behavioral problems. The patient is nervous/anxious. No Known Allergies  Allergy historyupdated.     Outpatient Medications Marked as Taking for the 8/22/23 encounter (Office Visit) with Rhonda Phillip MD   Medication Sig Dispense Refill    amLODIPine (NORVASC) 5 MG tablet Take 1 tablet by mouth daily 30 tablet 5    omeprazole (PRILOSEC) 40 MG delayed release capsule TAKE

## 2023-08-30 ASSESSMENT — ENCOUNTER SYMPTOMS
ABDOMINAL PAIN: 0
SHORTNESS OF BREATH: 1
CHEST TIGHTNESS: 0
COUGH: 0
WHEEZING: 0

## 2023-12-21 ENCOUNTER — OFFICE VISIT (OUTPATIENT)
Dept: FAMILY MEDICINE CLINIC | Age: 48
End: 2023-12-21
Payer: COMMERCIAL

## 2023-12-21 VITALS
OXYGEN SATURATION: 93 % | HEART RATE: 81 BPM | SYSTOLIC BLOOD PRESSURE: 122 MMHG | WEIGHT: 214 LBS | DIASTOLIC BLOOD PRESSURE: 74 MMHG | BODY MASS INDEX: 33.52 KG/M2

## 2023-12-21 DIAGNOSIS — R07.89 ATYPICAL CHEST PAIN: ICD-10-CM

## 2023-12-21 DIAGNOSIS — R10.13 EPIGASTRIC CRAMPING: ICD-10-CM

## 2023-12-21 DIAGNOSIS — I10 PRIMARY HYPERTENSION: ICD-10-CM

## 2023-12-21 DIAGNOSIS — F10.20 ALCOHOLISM (HCC): ICD-10-CM

## 2023-12-21 PROCEDURE — 3074F SYST BP LT 130 MM HG: CPT | Performed by: FAMILY MEDICINE

## 2023-12-21 PROCEDURE — 3078F DIAST BP <80 MM HG: CPT | Performed by: FAMILY MEDICINE

## 2023-12-21 PROCEDURE — 99214 OFFICE O/P EST MOD 30 MIN: CPT | Performed by: FAMILY MEDICINE

## 2023-12-21 RX ORDER — OMEPRAZOLE 40 MG/1
CAPSULE, DELAYED RELEASE ORAL
Qty: 30 CAPSULE | Refills: 5 | Status: SHIPPED | OUTPATIENT
Start: 2023-12-21

## 2023-12-21 RX ORDER — HYDROXYZINE 50 MG/1
50 TABLET, FILM COATED ORAL EVERY 8 HOURS PRN
Qty: 30 TABLET | Refills: 5 | Status: SHIPPED | OUTPATIENT
Start: 2023-12-21

## 2023-12-21 RX ORDER — ERGOCALCIFEROL 1.25 MG/1
CAPSULE ORAL
Qty: 4 CAPSULE | Refills: 5 | Status: SHIPPED | OUTPATIENT
Start: 2023-12-21

## 2023-12-21 RX ORDER — IBUPROFEN 800 MG/1
800 TABLET ORAL 2 TIMES DAILY PRN
Qty: 60 TABLET | Refills: 5 | Status: SHIPPED | OUTPATIENT
Start: 2023-12-21

## 2023-12-21 RX ORDER — HYDROXYZINE 50 MG/1
50 TABLET, FILM COATED ORAL EVERY 8 HOURS PRN
COMMUNITY
Start: 2023-11-01 | End: 2023-12-21 | Stop reason: SDUPTHER

## 2023-12-21 RX ORDER — AMLODIPINE BESYLATE 5 MG/1
5 TABLET ORAL DAILY
Qty: 30 TABLET | Refills: 5 | Status: SHIPPED | OUTPATIENT
Start: 2023-12-21 | End: 2024-06-18

## 2023-12-21 NOTE — PROGRESS NOTES
Chief Complaint   Patient presents with    Follow-up     Recheck on alcoholism    Hypertension     B is good on current medications, weight is trending down       [place] NARRATIVE:    Last seen in August. He did not get abd ultrasound. Belly is much better. Not drinking. Working full time. Doing well with wife, has extended time off. He credits not drinking with this imporvment, is not really in active counselling though. Patient is established unless otherwise noted. Above chief complaint and [place] obtained by this physician provider. Review of Systems   Constitutional:  Negative for activity change, chills, fatigue and fever. HENT:  Negative for congestion. Respiratory:  Negative for cough, chest tightness, shortness of breath and wheezing. Cardiovascular:  Negative for chest pain and leg swelling. Gastrointestinal:  Negative for abdominal pain. Musculoskeletal:  Negative for back pain and myalgias. Skin:  Negative for rash. Psychiatric/Behavioral:  Negative for behavioral problems and dysphoric mood. No Known Allergies  Allergy historyupdated. Outpatient Medications Marked as Taking for the 12/21/23 encounter (Office Visit) with Georgette Rain MD   Medication Sig Dispense Refill    ibuprofen (ADVIL;MOTRIN) 800 MG tablet Take 1 tablet by mouth 2 times daily as needed for Pain 60 tablet 5    vitamin D (ERGOCALCIFEROL) 1.25 MG (59068 UT) CAPS capsule TAKE 1 CAPSULE BY MOUTH ONE TIME PER WEEK 4 capsule 5    amLODIPine (NORVASC) 5 MG tablet Take 1 tablet by mouth daily 30 tablet 5    hydrOXYzine HCl (ATARAX) 50 MG tablet Take 1 tablet by mouth every 8 hours as needed for Anxiety 30 tablet 5    omeprazole (PRILOSEC) 40 MG delayed release capsule TAKE 1 CAPSULE BY MOUTH EVERY DAY IN THE MORNING BEFORE BREAKFAST 30 capsule 5       Tobacco use history updated.    Social History     Tobacco Use   Smoking Status Every Day    Current packs/day: 1.00    Average packs/day: 1 pack/day for

## 2024-06-20 ENCOUNTER — OFFICE VISIT (OUTPATIENT)
Dept: FAMILY MEDICINE CLINIC | Age: 49
End: 2024-06-20
Payer: COMMERCIAL

## 2024-06-20 VITALS
BODY MASS INDEX: 32.13 KG/M2 | HEART RATE: 76 BPM | HEIGHT: 68 IN | DIASTOLIC BLOOD PRESSURE: 80 MMHG | WEIGHT: 212 LBS | OXYGEN SATURATION: 90 % | SYSTOLIC BLOOD PRESSURE: 122 MMHG

## 2024-06-20 DIAGNOSIS — E78.2 MIXED HYPERLIPIDEMIA: ICD-10-CM

## 2024-06-20 DIAGNOSIS — R73.9 HYPERGLYCEMIA: ICD-10-CM

## 2024-06-20 DIAGNOSIS — R53.83 OTHER FATIGUE: ICD-10-CM

## 2024-06-20 DIAGNOSIS — F17.200 TOBACCO DEPENDENCE: ICD-10-CM

## 2024-06-20 DIAGNOSIS — D75.89 MACROCYTOSIS WITHOUT ANEMIA: ICD-10-CM

## 2024-06-20 DIAGNOSIS — E29.1 HYPOGONADISM IN MALE: ICD-10-CM

## 2024-06-20 DIAGNOSIS — Z12.5 SCREENING FOR PROSTATE CANCER: ICD-10-CM

## 2024-06-20 DIAGNOSIS — I10 PRIMARY HYPERTENSION: ICD-10-CM

## 2024-06-20 DIAGNOSIS — Z00.00 WELL ADULT EXAM: Primary | ICD-10-CM

## 2024-06-20 DIAGNOSIS — J44.1 CHRONIC OBSTRUCTIVE PULMONARY DISEASE WITH ACUTE EXACERBATION (HCC): ICD-10-CM

## 2024-06-20 DIAGNOSIS — R07.89 ATYPICAL CHEST PAIN: ICD-10-CM

## 2024-06-20 DIAGNOSIS — F10.20 ALCOHOLISM (HCC): ICD-10-CM

## 2024-06-20 DIAGNOSIS — R10.13 EPIGASTRIC CRAMPING: ICD-10-CM

## 2024-06-20 DIAGNOSIS — Z51.81 MEDICATION MONITORING ENCOUNTER: ICD-10-CM

## 2024-06-20 DIAGNOSIS — R68.82 LOW LIBIDO: ICD-10-CM

## 2024-06-20 PROCEDURE — 3079F DIAST BP 80-89 MM HG: CPT | Performed by: FAMILY MEDICINE

## 2024-06-20 PROCEDURE — 99396 PREV VISIT EST AGE 40-64: CPT | Performed by: FAMILY MEDICINE

## 2024-06-20 PROCEDURE — 3074F SYST BP LT 130 MM HG: CPT | Performed by: FAMILY MEDICINE

## 2024-06-20 PROCEDURE — 36415 COLL VENOUS BLD VENIPUNCTURE: CPT | Performed by: FAMILY MEDICINE

## 2024-06-20 RX ORDER — OMEPRAZOLE 40 MG/1
CAPSULE, DELAYED RELEASE ORAL
Qty: 30 CAPSULE | Refills: 12 | Status: SHIPPED | OUTPATIENT
Start: 2024-06-20

## 2024-06-20 RX ORDER — AMLODIPINE BESYLATE 5 MG/1
5 TABLET ORAL DAILY
Qty: 30 TABLET | Refills: 12 | Status: SHIPPED | OUTPATIENT
Start: 2024-06-20 | End: 2025-07-15

## 2024-06-20 RX ORDER — ERGOCALCIFEROL 1.25 MG/1
CAPSULE ORAL
Qty: 4 CAPSULE | Refills: 5 | Status: SHIPPED | OUTPATIENT
Start: 2024-06-20

## 2024-06-20 ASSESSMENT — PATIENT HEALTH QUESTIONNAIRE - PHQ9
SUM OF ALL RESPONSES TO PHQ QUESTIONS 1-9: 4
SUM OF ALL RESPONSES TO PHQ9 QUESTIONS 1 & 2: 2
SUM OF ALL RESPONSES TO PHQ QUESTIONS 1-9: 4
9. THOUGHTS THAT YOU WOULD BE BETTER OFF DEAD, OR OF HURTING YOURSELF: NOT AT ALL
6. FEELING BAD ABOUT YOURSELF - OR THAT YOU ARE A FAILURE OR HAVE LET YOURSELF OR YOUR FAMILY DOWN: SEVERAL DAYS
10. IF YOU CHECKED OFF ANY PROBLEMS, HOW DIFFICULT HAVE THESE PROBLEMS MADE IT FOR YOU TO DO YOUR WORK, TAKE CARE OF THINGS AT HOME, OR GET ALONG WITH OTHER PEOPLE: NOT DIFFICULT AT ALL
4. FEELING TIRED OR HAVING LITTLE ENERGY: SEVERAL DAYS
7. TROUBLE CONCENTRATING ON THINGS, SUCH AS READING THE NEWSPAPER OR WATCHING TELEVISION: NOT AT ALL
8. MOVING OR SPEAKING SO SLOWLY THAT OTHER PEOPLE COULD HAVE NOTICED. OR THE OPPOSITE, BEING SO FIGETY OR RESTLESS THAT YOU HAVE BEEN MOVING AROUND A LOT MORE THAN USUAL: NOT AT ALL
2. FEELING DOWN, DEPRESSED OR HOPELESS: SEVERAL DAYS
SUM OF ALL RESPONSES TO PHQ QUESTIONS 1-9: 4
1. LITTLE INTEREST OR PLEASURE IN DOING THINGS: SEVERAL DAYS
3. TROUBLE FALLING OR STAYING ASLEEP: NOT AT ALL
SUM OF ALL RESPONSES TO PHQ QUESTIONS 1-9: 4
5. POOR APPETITE OR OVEREATING: NOT AT ALL

## 2024-06-20 ASSESSMENT — ANXIETY QUESTIONNAIRES
7. FEELING AFRAID AS IF SOMETHING AWFUL MIGHT HAPPEN: SEVERAL DAYS
5. BEING SO RESTLESS THAT IT IS HARD TO SIT STILL: NOT AT ALL
1. FEELING NERVOUS, ANXIOUS, OR ON EDGE: SEVERAL DAYS
IF YOU CHECKED OFF ANY PROBLEMS ON THIS QUESTIONNAIRE, HOW DIFFICULT HAVE THESE PROBLEMS MADE IT FOR YOU TO DO YOUR WORK, TAKE CARE OF THINGS AT HOME, OR GET ALONG WITH OTHER PEOPLE: NOT DIFFICULT AT ALL
2. NOT BEING ABLE TO STOP OR CONTROL WORRYING: NOT AT ALL
3. WORRYING TOO MUCH ABOUT DIFFERENT THINGS: SEVERAL DAYS
6. BECOMING EASILY ANNOYED OR IRRITABLE: SEVERAL DAYS
GAD7 TOTAL SCORE: 5
4. TROUBLE RELAXING: SEVERAL DAYS

## 2024-06-20 NOTE — PROGRESS NOTES
Chief Complaint   Patient presents with    Annual Exam     Annual checkup on multiple issues, previous heavy alcohol use and currently denies any alcohol use    Hypertension     Patient is no longer on blood pressure medication    Vitamin D deficiency     Patient is not using his vitamin D regularly, this was encouraged       [place] NARRATIVE:    DOESNT think he is taking BP med or stomach med, but has them--says \"I'm not good at taking them\"    Not drinking.  Some marital stress with low libido.      Patient is established unless otherwise noted.  Above chief complaint and [place] obtained by this physician provider.     Review of Systems   Constitutional:  Positive for fatigue. Negative for activity change, chills and fever.   HENT:  Negative for congestion.    Respiratory:  Negative for cough, chest tightness, shortness of breath and wheezing.    Cardiovascular:  Negative for chest pain and leg swelling.   Gastrointestinal:  Negative for abdominal pain.   Musculoskeletal:  Negative for back pain and myalgias.   Skin:  Negative for rash.   Psychiatric/Behavioral:  Negative for behavioral problems and dysphoric mood (Patient reports significant irritability).        No Known Allergies  Allergy historyupdated.    Outpatient Medications Marked as Taking for the 6/20/24 encounter (Office Visit) with Linda Lee MD   Medication Sig Dispense Refill    amLODIPine (NORVASC) 5 MG tablet Take 1 tablet by mouth daily 30 tablet 12    omeprazole (PRILOSEC) 40 MG delayed release capsule TAKE 1 CAPSULE BY MOUTH EVERY DAY IN THE MORNING BEFORE BREAKFAST 30 capsule 12    vitamin D (ERGOCALCIFEROL) 1.25 MG (26793 UT) CAPS capsule TAKE 1 CAPSULE BY MOUTH ONE TIME PER WEEK 4 capsule 5    ibuprofen (ADVIL;MOTRIN) 800 MG tablet Take 1 tablet by mouth 2 times daily as needed for Pain 60 tablet 5    hydrOXYzine HCl (ATARAX) 50 MG tablet Take 1 tablet by mouth every 8 hours as needed for Anxiety 30 tablet 5       Tobacco use history updated.

## 2024-06-21 LAB
25(OH)D3 SERPL-MCNC: 40.5 NG/ML
ALBUMIN SERPL-MCNC: 4.5 G/DL (ref 3.4–5)
ALBUMIN/GLOB SERPL: 2 {RATIO} (ref 1.1–2.2)
ALP SERPL-CCNC: 86 U/L (ref 40–129)
ALT SERPL-CCNC: 23 U/L (ref 10–40)
ANION GAP SERPL CALCULATED.3IONS-SCNC: 11 MMOL/L (ref 3–16)
AST SERPL-CCNC: 23 U/L (ref 15–37)
BILIRUB SERPL-MCNC: 0.3 MG/DL (ref 0–1)
BUN SERPL-MCNC: 17 MG/DL (ref 7–20)
CALCIUM SERPL-MCNC: 9.6 MG/DL (ref 8.3–10.6)
CHLORIDE SERPL-SCNC: 97 MMOL/L (ref 99–110)
CHOLEST SERPL-MCNC: 184 MG/DL (ref 0–199)
CO2 SERPL-SCNC: 26 MMOL/L (ref 21–32)
CREAT SERPL-MCNC: 0.7 MG/DL (ref 0.9–1.3)
DEPRECATED RDW RBC AUTO: 13.2 % (ref 12.4–15.4)
EST. AVERAGE GLUCOSE BLD GHB EST-MCNC: 122.6 MG/DL
GFR SERPLBLD CREATININE-BSD FMLA CKD-EPI: >90 ML/MIN/{1.73_M2}
GLUCOSE SERPL-MCNC: 86 MG/DL (ref 70–99)
HBA1C MFR BLD: 5.9 %
HCT VFR BLD AUTO: 48.4 % (ref 40.5–52.5)
HDLC SERPL-MCNC: 49 MG/DL (ref 40–60)
HGB BLD-MCNC: 16.7 G/DL (ref 13.5–17.5)
LDLC SERPL CALC-MCNC: 110 MG/DL
MCH RBC QN AUTO: 32.8 PG (ref 26–34)
MCHC RBC AUTO-ENTMCNC: 34.5 G/DL (ref 31–36)
MCV RBC AUTO: 95.1 FL (ref 80–100)
PLATELET # BLD AUTO: 225 K/UL (ref 135–450)
PMV BLD AUTO: 9.1 FL (ref 5–10.5)
POTASSIUM SERPL-SCNC: 4.5 MMOL/L (ref 3.5–5.1)
PROT SERPL-MCNC: 6.8 G/DL (ref 6.4–8.2)
PSA SERPL DL<=0.01 NG/ML-MCNC: 0.19 NG/ML (ref 0–4)
RBC # BLD AUTO: 5.09 M/UL (ref 4.2–5.9)
SODIUM SERPL-SCNC: 134 MMOL/L (ref 136–145)
TRIGL SERPL-MCNC: 126 MG/DL (ref 0–150)
VLDLC SERPL CALC-MCNC: 25 MG/DL
WBC # BLD AUTO: 7.2 K/UL (ref 4–11)

## 2024-06-22 LAB — TESTOST SERPL-MCNC: 114 NG/DL (ref 193–740)

## 2024-06-26 PROBLEM — R73.9 HYPERGLYCEMIA: Status: ACTIVE | Noted: 2024-06-26

## 2024-06-26 PROBLEM — E29.1 HYPOGONADISM IN MALE: Status: ACTIVE | Noted: 2024-06-26

## 2024-06-26 ASSESSMENT — ENCOUNTER SYMPTOMS
WHEEZING: 0
CHEST TIGHTNESS: 0
ABDOMINAL PAIN: 0
SHORTNESS OF BREATH: 0
BACK PAIN: 0
COUGH: 0

## 2024-06-26 NOTE — RESULT ENCOUNTER NOTE
Testosterone results reviewed and testosterone is low at 114.  We can consider patch or gel or injection, just see where he wants me to send that prescription.     Metabolic panel shows low sodium and low chloride.  He may be dehydrated or he may be low on salt.  He states he is not drinking but this be a pattern seen with regular beer drinking so make sure to affirm that he should not drink with these lab abnormalities.  Cholesterol profile is acceptable and similar to last year.  Hemoglobin A1c is 5.9 making him prediabetic.  PSA is normal, vitamin D and blood count are all normal.  Recheck can be next year unless he needs sooner.

## 2024-07-02 ENCOUNTER — TELEPHONE (OUTPATIENT)
Dept: FAMILY MEDICINE CLINIC | Age: 49
End: 2024-07-02

## 2024-07-02 DIAGNOSIS — E29.1 HYPOGONADISM IN MALE: Primary | ICD-10-CM

## 2024-07-02 RX ORDER — TESTOSTERONE 10 MG/.5G
1 GEL, METERED TOPICAL DAILY
Qty: 1 G | Refills: 0 | Status: SHIPPED | OUTPATIENT
Start: 2024-07-02 | End: 2024-10-10

## 2024-07-02 NOTE — TELEPHONE ENCOUNTER
Insurance is not covering pts Testosterone patch. Pharmacy needs an alternative. Pharmacy [facility] please advise

## 2024-07-02 NOTE — TELEPHONE ENCOUNTER
Pharmacy called and said the patch was not covered although it is listed in epic as requiring prior authorization.  I substituted testosterone gel, this is its preferred but prior Auth is required.  I am fearful that only the injections will be covered but if they reject this 1 also we will have to call the pharmacy and see why they are not requesting a prior authorization and are instead stating they need a replacement prescription.

## 2024-07-24 ENCOUNTER — TELEPHONE (OUTPATIENT)
Dept: FAMILY MEDICINE CLINIC | Age: 49
End: 2024-07-24

## 2024-07-24 DIAGNOSIS — E29.1 HYPOGONADISM IN MALE: Primary | ICD-10-CM

## 2024-07-24 RX ORDER — TESTOSTERONE CYPIONATE 200 MG/ML
200 INJECTION, SOLUTION INTRAMUSCULAR ONCE
Qty: 2 ML | Refills: 2 | Status: SHIPPED | OUTPATIENT
Start: 2024-07-24 | End: 2024-07-24

## 2024-07-24 NOTE — TELEPHONE ENCOUNTER
Pts testosterone needs a PA for insurance to cover. He said if he can't get the insurance to cover this he would just pay out of pocket. Pt stated he wants to get started on something to help him asap. Please advise

## 2024-09-19 ENCOUNTER — TELEPHONE (OUTPATIENT)
Dept: FAMILY MEDICINE CLINIC | Age: 49
End: 2024-09-19

## 2024-09-19 DIAGNOSIS — E29.1 HYPOGONADISM MALE: Primary | ICD-10-CM

## 2024-09-21 ENCOUNTER — HOSPITAL ENCOUNTER (OUTPATIENT)
Age: 49
Discharge: HOME | End: 2024-09-21
Payer: COMMERCIAL

## 2024-09-21 DIAGNOSIS — E29.1 HYPOGONADISM MALE: ICD-10-CM

## 2024-09-21 PROCEDURE — 36415 COLL VENOUS BLD VENIPUNCTURE: CPT

## 2024-09-21 PROCEDURE — 84403 ASSAY OF TOTAL TESTOSTERONE: CPT

## 2024-09-24 LAB — TESTOSTERONE TOTAL-MALE: 324 NG/DL (ref 300–890)

## 2025-06-06 ENCOUNTER — HOSPITAL ENCOUNTER (OUTPATIENT)
Age: 50
Discharge: HOME | End: 2025-06-06
Payer: COMMERCIAL

## 2025-06-06 ENCOUNTER — OFFICE VISIT (OUTPATIENT)
Dept: FAMILY MEDICINE CLINIC | Age: 50
End: 2025-06-06
Payer: COMMERCIAL

## 2025-06-06 ENCOUNTER — HOSPITAL ENCOUNTER (OUTPATIENT)
Dept: GENERAL RADIOLOGY | Age: 50
Discharge: HOME | End: 2025-06-06
Payer: COMMERCIAL

## 2025-06-06 VITALS
BODY MASS INDEX: 34.82 KG/M2 | OXYGEN SATURATION: 89 % | SYSTOLIC BLOOD PRESSURE: 132 MMHG | HEART RATE: 86 BPM | WEIGHT: 229 LBS | DIASTOLIC BLOOD PRESSURE: 82 MMHG

## 2025-06-06 DIAGNOSIS — J44.1 CHRONIC OBSTRUCTIVE PULMONARY DISEASE WITH ACUTE EXACERBATION (HCC): ICD-10-CM

## 2025-06-06 DIAGNOSIS — J44.1 COPD EXACERBATION (HCC): ICD-10-CM

## 2025-06-06 DIAGNOSIS — E78.2 MIXED HYPERLIPIDEMIA: ICD-10-CM

## 2025-06-06 DIAGNOSIS — J44.1 CHRONIC OBSTRUCTIVE PULMONARY DISEASE WITH ACUTE EXACERBATION (HCC): Primary | ICD-10-CM

## 2025-06-06 DIAGNOSIS — Z87.891 FORMER SMOKER: ICD-10-CM

## 2025-06-06 DIAGNOSIS — I10 PRIMARY HYPERTENSION: ICD-10-CM

## 2025-06-06 DIAGNOSIS — Z99.81 DEPENDENCE ON CONTINUOUS SUPPLEMENTAL OXYGEN: ICD-10-CM

## 2025-06-06 DIAGNOSIS — R73.03 PREDIABETES: ICD-10-CM

## 2025-06-06 LAB
ALBUMIN SERPL-MCNC: 3.9 G/DL (ref 3.4–5)
ALBUMIN/GLOB SERPL: 1.4 {RATIO} (ref 1.1–2.2)
ALP SERPL-CCNC: 79 U/L (ref 40–129)
ALT SERPL-CCNC: 40 U/L (ref 10–40)
ANION GAP SERPL CALCULATED.3IONS-SCNC: 10 MMOL/L (ref 9–17)
AST SERPL-CCNC: 21 U/L (ref 15–37)
BASOPHILS # BLD: 0.01 K/UL
BASOPHILS NFR BLD: 0 % (ref 0–1)
BILIRUB SERPL-MCNC: 0.3 MG/DL (ref 0–1)
BUN SERPL-MCNC: 14 MG/DL (ref 7–20)
CALCIUM SERPL-MCNC: 9.4 MG/DL (ref 8.3–10.6)
CHLORIDE SERPL-SCNC: 97 MMOL/L (ref 99–110)
CHOLEST SERPL-MCNC: 158 MG/DL (ref 125–199)
CO2 SERPL-SCNC: 28 MMOL/L (ref 21–32)
CREAT SERPL-MCNC: 0.6 MG/DL (ref 0.9–1.3)
EOSINOPHIL # BLD: 0 K/UL
EOSINOPHILS RELATIVE PERCENT: 0 % (ref 0–3)
ERYTHROCYTE [DISTWIDTH] IN BLOOD BY AUTOMATED COUNT: 12.9 % (ref 11.7–14.9)
EST. AVERAGE GLUCOSE BLD GHB EST-MCNC: 138 MG/DL
GFR, ESTIMATED: >90 ML/MIN/1.73M2
GLUCOSE SERPL-MCNC: 105 MG/DL (ref 74–99)
HBA1C MFR BLD: 6.4 % (ref 4.2–6.3)
HCT VFR BLD AUTO: 53.5 % (ref 42–52)
HDLC SERPL-MCNC: 72 MG/DL
HGB BLD-MCNC: 17.2 G/DL (ref 13.5–18)
IMM GRANULOCYTES # BLD AUTO: 0.04 K/UL
IMM GRANULOCYTES NFR BLD: 1 %
LDLC SERPL CALC-MCNC: 69 MG/DL
LYMPHOCYTES NFR BLD: 0.9 K/UL
LYMPHOCYTES RELATIVE PERCENT: 14 % (ref 24–44)
MCH RBC QN AUTO: 31.2 PG (ref 27–31)
MCHC RBC AUTO-ENTMCNC: 32.1 G/DL (ref 32–36)
MCV RBC AUTO: 96.9 FL (ref 78–100)
MONOCYTES NFR BLD: 0.22 K/UL
MONOCYTES NFR BLD: 3 % (ref 0–5)
NEUTROPHILS NFR BLD: 82 % (ref 36–66)
NEUTS SEG NFR BLD: 5.47 K/UL
PLATELET # BLD AUTO: 273 K/UL (ref 140–440)
PMV BLD AUTO: 9.5 FL (ref 7.5–11.1)
POTASSIUM SERPL-SCNC: 4.5 MMOL/L (ref 3.5–5.1)
PROT SERPL-MCNC: 6.7 G/DL (ref 6.4–8.2)
RBC # BLD AUTO: 5.52 M/UL (ref 4.6–6.2)
SODIUM SERPL-SCNC: 135 MMOL/L (ref 136–145)
TRIGL SERPL-MCNC: 83 MG/DL
WBC OTHER # BLD: 6.6 K/UL (ref 4–10.5)

## 2025-06-06 PROCEDURE — 80053 COMPREHEN METABOLIC PANEL: CPT

## 2025-06-06 PROCEDURE — 83036 HEMOGLOBIN GLYCOSYLATED A1C: CPT

## 2025-06-06 PROCEDURE — 85025 COMPLETE CBC W/AUTO DIFF WBC: CPT

## 2025-06-06 PROCEDURE — 3075F SYST BP GE 130 - 139MM HG: CPT | Performed by: NURSE PRACTITIONER

## 2025-06-06 PROCEDURE — 3079F DIAST BP 80-89 MM HG: CPT | Performed by: NURSE PRACTITIONER

## 2025-06-06 PROCEDURE — 80061 LIPID PANEL: CPT

## 2025-06-06 PROCEDURE — 71046 X-RAY EXAM CHEST 2 VIEWS: CPT

## 2025-06-06 PROCEDURE — 99214 OFFICE O/P EST MOD 30 MIN: CPT | Performed by: NURSE PRACTITIONER

## 2025-06-06 RX ORDER — LORAZEPAM 0.5 MG/1
0.5 TABLET ORAL
COMMUNITY
Start: 2025-06-04

## 2025-06-06 RX ORDER — PREDNISONE 20 MG/1
TABLET ORAL
COMMUNITY
Start: 2025-06-04

## 2025-06-06 SDOH — ECONOMIC STABILITY: FOOD INSECURITY: WITHIN THE PAST 12 MONTHS, THE FOOD YOU BOUGHT JUST DIDN'T LAST AND YOU DIDN'T HAVE MONEY TO GET MORE.: NEVER TRUE

## 2025-06-06 SDOH — ECONOMIC STABILITY: FOOD INSECURITY: WITHIN THE PAST 12 MONTHS, YOU WORRIED THAT YOUR FOOD WOULD RUN OUT BEFORE YOU GOT MONEY TO BUY MORE.: NEVER TRUE

## 2025-06-06 ASSESSMENT — PATIENT HEALTH QUESTIONNAIRE - PHQ9
5. POOR APPETITE OR OVEREATING: SEVERAL DAYS
1. LITTLE INTEREST OR PLEASURE IN DOING THINGS: SEVERAL DAYS
2. FEELING DOWN, DEPRESSED OR HOPELESS: SEVERAL DAYS
10. IF YOU CHECKED OFF ANY PROBLEMS, HOW DIFFICULT HAVE THESE PROBLEMS MADE IT FOR YOU TO DO YOUR WORK, TAKE CARE OF THINGS AT HOME, OR GET ALONG WITH OTHER PEOPLE: SOMEWHAT DIFFICULT
SUM OF ALL RESPONSES TO PHQ QUESTIONS 1-9: 8
4. FEELING TIRED OR HAVING LITTLE ENERGY: SEVERAL DAYS
SUM OF ALL RESPONSES TO PHQ QUESTIONS 1-9: 9
9. THOUGHTS THAT YOU WOULD BE BETTER OFF DEAD, OR OF HURTING YOURSELF: SEVERAL DAYS
8. MOVING OR SPEAKING SO SLOWLY THAT OTHER PEOPLE COULD HAVE NOTICED. OR THE OPPOSITE, BEING SO FIGETY OR RESTLESS THAT YOU HAVE BEEN MOVING AROUND A LOT MORE THAN USUAL: SEVERAL DAYS
SUM OF ALL RESPONSES TO PHQ QUESTIONS 1-9: 9
6. FEELING BAD ABOUT YOURSELF - OR THAT YOU ARE A FAILURE OR HAVE LET YOURSELF OR YOUR FAMILY DOWN: SEVERAL DAYS
7. TROUBLE CONCENTRATING ON THINGS, SUCH AS READING THE NEWSPAPER OR WATCHING TELEVISION: SEVERAL DAYS
SUM OF ALL RESPONSES TO PHQ QUESTIONS 1-9: 9
3. TROUBLE FALLING OR STAYING ASLEEP: SEVERAL DAYS

## 2025-06-06 ASSESSMENT — COLUMBIA-SUICIDE SEVERITY RATING SCALE - C-SSRS
2. HAVE YOU ACTUALLY HAD ANY THOUGHTS OF KILLING YOURSELF?: NO
6. HAVE YOU EVER DONE ANYTHING, STARTED TO DO ANYTHING, OR PREPARED TO DO ANYTHING TO END YOUR LIFE?: NO
1. WITHIN THE PAST MONTH, HAVE YOU WISHED YOU WERE DEAD OR WISHED YOU COULD GO TO SLEEP AND NOT WAKE UP?: NO

## 2025-06-06 NOTE — PROGRESS NOTES
2025     Anam Cowan (:  1975) is a 50 y.o. male, here for evaluation of the following medical concerns:    History of Present Illness  The patient presents for a hospital follow-up.    Respiratory Symptoms  - Began experiencing severe symptoms last Monday, including shortness of breath, chest congestion, and difficulty clearing his chest.  - Despite recent smoking cessation, he was unable to clear his chest as usual.  - Sought assistance from the fire department, where he was found to have elevated blood pressure and an oxygen saturation of 74 percent.  - Advised to seek immediate medical attention but chose to return home and self-administer amlodipine.  - Condition fluctuated, with persistent shortness of breath preventing him from relaxing.  - Subsequently admitted to [facility] due to hypoxia.  - Treated with steroids, breathing treatments, Mucinex, and an incentive spirometer.  - Placed on 8 L of oxygen, which was gradually reduced to 1 or 2 L.  - Discharged with two oxygen tanks and a portable 02 device  - Cleared to return to work on Monday but is unable to use oxygen at his workplace.  - Uses oxygen at home and carries a portable tank, which he left in the car today to assess his tolerance without it.  - Oxygen saturation remains in the high 80s to low 90s without oxygen and around 90 to 92 percent with 1 L of oxygen.  - Has a diagnosis of COPD since  and quit smoking last Monday.  - Recalls failing a pre-employment physical due to poor lung capacity.  - Reports feeling well when his oxygen saturation is between 88 and 92 percent but experiences lightheadedness when bending over.  - Noticed improvement in his symptoms since starting the breathing treatments.  - Has two days remaining of his steroid course and uses an albuterol inhaler every six hours.        Blood Pressure Management  - Not currently taking amlodipine for blood pressure management.    Testosterone

## 2025-06-09 ENCOUNTER — RESULTS FOLLOW-UP (OUTPATIENT)
Dept: FAMILY MEDICINE CLINIC | Age: 50
End: 2025-06-09

## 2025-06-11 ENCOUNTER — OFFICE VISIT (OUTPATIENT)
Dept: FAMILY MEDICINE CLINIC | Age: 50
End: 2025-06-11
Payer: COMMERCIAL

## 2025-06-11 VITALS
DIASTOLIC BLOOD PRESSURE: 72 MMHG | SYSTOLIC BLOOD PRESSURE: 116 MMHG | OXYGEN SATURATION: 88 % | HEART RATE: 89 BPM | BODY MASS INDEX: 34.06 KG/M2 | WEIGHT: 224 LBS

## 2025-06-11 DIAGNOSIS — I50.9 CONGESTIVE HEART FAILURE, UNSPECIFIED HF CHRONICITY, UNSPECIFIED HEART FAILURE TYPE (HCC): ICD-10-CM

## 2025-06-11 DIAGNOSIS — F17.200 TOBACCO DEPENDENCE: ICD-10-CM

## 2025-06-11 DIAGNOSIS — Z86.79 HISTORY OF CARDIOMYOPATHY: ICD-10-CM

## 2025-06-11 DIAGNOSIS — J44.1 CHRONIC OBSTRUCTIVE PULMONARY DISEASE WITH ACUTE EXACERBATION (HCC): Primary | ICD-10-CM

## 2025-06-11 DIAGNOSIS — I10 PRIMARY HYPERTENSION: ICD-10-CM

## 2025-06-11 PROCEDURE — 99214 OFFICE O/P EST MOD 30 MIN: CPT | Performed by: FAMILY MEDICINE

## 2025-06-11 PROCEDURE — 3074F SYST BP LT 130 MM HG: CPT | Performed by: FAMILY MEDICINE

## 2025-06-11 PROCEDURE — 3078F DIAST BP <80 MM HG: CPT | Performed by: FAMILY MEDICINE

## 2025-06-11 NOTE — PROGRESS NOTES
Patient developed severe shortness of breath on Monday, June 2 and was seen at the [facility] where his O2 sat was 74% he refused admission or transport at that point but was subsequently admitted to [facility], records of that admission are not available due to epic downtime at that hospital.  He initially required steroids breathing treatments Mucinex and 8 L of oxygen he was discharged home and has continued to require oxygen which would prevent him from his regular employment.  He has COPD diagnosed in 2022, smoked up until the onset of the symptoms and has a remote history of cardiomyopathy and hypertension, has not been compliant with blood pressure medication.  We are concerned of course for COPD exacerbation and possible CHF or cardiomyopathy.  He has a history of very heavy alcohol use but has been abstinent since 2024.  Review of hospital records will be limited due to unavailability of hospital records from [place] at this time.  He has seen my partner once in follow-up and her note was reviewed today, he was asked to schedule with me as he has problems with his employment as the hospital apparently did not take him off and return him to work even though he is still oxygen dependent.

## 2025-06-11 NOTE — PROGRESS NOTES
Chief Complaint   Patient presents with    Forms     Needs fmla forms completed, was hospitalized then treated by my partner last week for HFU    COPD     With hypoxia    Hypertension     Hx of htn and noncompliance with medication, history of cardiomyopathy with mildly reduced EF last evaluation 2022    History of alcoholism     Patient with history of alcoholism, quit drinking last year and remains abstinent per today's report       [place] NARRATIVE:    History of Present Illness  The patient presents for follow-up after hospitalization and a subsequent visit last week. He developed severe hypoxia and shortness of breath that required admission and has been unable to return to work since then. He has a history of hypertension, which he was noncompliant with, but his blood pressure is controlled today. His pulse oximetry reading is 88% at best. He is not using the oxygen issued by the hospital. He has a remote history of cardiomyopathy and heavy alcohol use but is currently abstinent.    Symptoms began on 06/02/2025, initially presenting as shortness of breath. He also experienced a sore throat and cough with persistent phlegm production. These symptoms have improved since he ceased smoking approximately 1 week ago. He was not prescribed antibiotics during his hospital stay but completed a course of steroids a few days ago. Current medications include an albuterol inhaler, Mucinex, and Ativan. He was discharged with supplemental oxygen, which he continues to use at night. He reports feeling significantly better, although he still experiences some fatigue and occasional shortness of breath. He has noticed a change in his voice and has been informed that his lung capacity is equivalent to that of a 90-year-old. He has scheduled an appointment with Dr. Norris, a pulmonologist, for 06/19/2025. He recalls feeling extremely unwell during his hospital stay, describing sensations of impending fainting and death. He  Adequate: hears normal conversation without difficulty

## 2025-06-18 ASSESSMENT — ENCOUNTER SYMPTOMS
ABDOMINAL PAIN: 0
SHORTNESS OF BREATH: 1
BACK PAIN: 0
CHEST TIGHTNESS: 0
COUGH: 0
WHEEZING: 0

## 2025-06-20 ENCOUNTER — TELEPHONE (OUTPATIENT)
Dept: FAMILY MEDICINE CLINIC | Age: 50
End: 2025-06-20

## 2025-06-20 NOTE — TELEPHONE ENCOUNTER
Called [employer] regarding the removal of the oxygen equipment in pts house. They stated they need a letter from the PCP stating pt no longer is in need of the equipment. I asked them if the ordering provider needed to sign off on this and they say as long as pt is actively seeing his PCP they do not need this. Fax letter to [phone]. Spoke to Su[employer]. [employer] p# [phone]. Please advise

## 2025-06-24 ENCOUNTER — TRANSCRIBE ORDERS (OUTPATIENT)
Dept: ADMINISTRATIVE | Age: 50
End: 2025-06-24

## 2025-06-24 DIAGNOSIS — J44.1 OBSTRUCTIVE CHRONIC BRONCHITIS WITH EXACERBATION (HCC): ICD-10-CM

## 2025-06-24 DIAGNOSIS — J96.11 CHRONIC HYPOXEMIC RESPIRATORY FAILURE (HCC): ICD-10-CM

## 2025-06-24 DIAGNOSIS — J43.9 EMPHYSEMATOUS BLEB (HCC): ICD-10-CM

## 2025-06-24 DIAGNOSIS — Z72.0 TOBACCO ABUSE: Primary | ICD-10-CM

## 2025-07-17 ENCOUNTER — HOSPITAL ENCOUNTER (OUTPATIENT)
Dept: CT IMAGING | Age: 50
Discharge: HOME | End: 2025-07-17
Payer: COMMERCIAL

## 2025-07-17 DIAGNOSIS — Z72.0 TOBACCO ABUSE: ICD-10-CM

## 2025-07-17 DIAGNOSIS — J96.11 CHRONIC HYPOXEMIC RESPIRATORY FAILURE (HCC): ICD-10-CM

## 2025-07-17 DIAGNOSIS — J44.1 OBSTRUCTIVE CHRONIC BRONCHITIS WITH EXACERBATION (HCC): ICD-10-CM

## 2025-07-17 DIAGNOSIS — J43.9 EMPHYSEMATOUS BLEB (HCC): ICD-10-CM

## 2025-07-17 PROCEDURE — 71250 CT THORAX DX C-: CPT

## 2025-07-28 ENCOUNTER — OFFICE VISIT (OUTPATIENT)
Dept: FAMILY MEDICINE CLINIC | Age: 50
End: 2025-07-28
Payer: COMMERCIAL

## 2025-07-28 VITALS
SYSTOLIC BLOOD PRESSURE: 132 MMHG | HEIGHT: 69 IN | DIASTOLIC BLOOD PRESSURE: 80 MMHG | BODY MASS INDEX: 34.96 KG/M2 | OXYGEN SATURATION: 95 % | WEIGHT: 236 LBS | HEART RATE: 83 BPM

## 2025-07-28 DIAGNOSIS — J44.1 CHRONIC OBSTRUCTIVE PULMONARY DISEASE WITH ACUTE EXACERBATION (HCC): ICD-10-CM

## 2025-07-28 DIAGNOSIS — E29.1 HYPOGONADISM IN MALE: Primary | ICD-10-CM

## 2025-07-28 DIAGNOSIS — I10 PRIMARY HYPERTENSION: ICD-10-CM

## 2025-07-28 PROCEDURE — 3075F SYST BP GE 130 - 139MM HG: CPT | Performed by: FAMILY MEDICINE

## 2025-07-28 PROCEDURE — 99214 OFFICE O/P EST MOD 30 MIN: CPT | Performed by: FAMILY MEDICINE

## 2025-07-28 PROCEDURE — 3079F DIAST BP 80-89 MM HG: CPT | Performed by: FAMILY MEDICINE

## 2025-07-28 RX ORDER — IBUPROFEN 800 MG/1
800 TABLET, FILM COATED ORAL 2 TIMES DAILY PRN
Qty: 60 TABLET | Refills: 2 | Status: SHIPPED | OUTPATIENT
Start: 2025-07-28

## 2025-08-08 ENCOUNTER — HOSPITAL ENCOUNTER (OUTPATIENT)
Dept: PULMONOLOGY | Age: 50
Discharge: HOME | End: 2025-08-08
Payer: COMMERCIAL

## 2025-08-08 DIAGNOSIS — Z72.0 TOBACCO ABUSE: ICD-10-CM

## 2025-08-08 DIAGNOSIS — J96.11 CHRONIC HYPOXEMIC RESPIRATORY FAILURE (HCC): ICD-10-CM

## 2025-08-08 DIAGNOSIS — J43.9 EMPHYSEMATOUS BLEB (HCC): ICD-10-CM

## 2025-08-08 DIAGNOSIS — J44.1 OBSTRUCTIVE CHRONIC BRONCHITIS WITH EXACERBATION (HCC): ICD-10-CM

## 2025-08-08 LAB
DISTANCE WALKED: 1060 FT
DLCO %PRED: 78 %
DLCO PRED: NORMAL
DLCO/VA %PRED: NORMAL
DLCO/VA PRED: NORMAL
DLCO/VA: NORMAL
DLCO: NORMAL
EXPIRATORY TIME-POST: NORMAL
EXPIRATORY TIME: NORMAL
FEF 25-75 %CHNG: NORMAL
FEF 25-75 POST %PRED: 41 %
FEF 25-75% %PRED-PRE: 27 L/SEC
FEF 25-75% PRED: NORMAL
FEF 25-75-POST: NORMAL
FEF 25-75-PRE: NORMAL
FEV1 %PRED-POST: 41 %
FEV1 %PRED-PRE: 36 %
FEV1 PRED: NORMAL
FEV1-POST: NORMAL
FEV1-PRE: NORMAL
FEV1/FVC %PRED-POST: 90 %
FEV1/FVC %PRED-PRE: 91 %
FEV1/FVC PRED: NORMAL
FEV1/FVC-POST: NORMAL
FEV1/FVC-PRE: NORMAL
FVC %PRED-POST: 46 L
FVC %PRED-PRE: 39 %
FVC PRED: NORMAL
FVC-POST: NORMAL
FVC-PRE: NORMAL
GAW %PRED: NORMAL
GAW PRED: NORMAL
GAW: NORMAL
IC PRE %PRED: NORMAL
IC PRED: NORMAL
IC: NORMAL
MEP: NORMAL
MIP: NORMAL
MVV %PRED-PRE: NORMAL
MVV PRED: NORMAL
MVV-PRE: NORMAL
PEF %PRED-POST: NORMAL
PEF %PRED-PRE: NORMAL
PEF PRED: NORMAL
PEF%CHNG: NORMAL
PEF-POST: NORMAL
PEF-PRE: NORMAL
RAW %PRED: NORMAL
RAW PRED: NORMAL
RAW: NORMAL
RV PRE %PRED: NORMAL
RV PRED: NORMAL
RV: NORMAL
SPO2: NORMAL %
SVC %PRED: 51 %
SVC PRED: NORMAL
SVC: NORMAL
TLC PRE %PRED: 70 %
TLC PRED: NORMAL
TLC: NORMAL
VA %PRED: NORMAL
VA PRED: NORMAL
VA: NORMAL
VTG %PRED: NORMAL
VTG PRED: NORMAL
VTG: NORMAL

## 2025-08-08 PROCEDURE — 94729 DIFFUSING CAPACITY: CPT

## 2025-08-08 PROCEDURE — 94618 PULMONARY STRESS TESTING: CPT

## 2025-08-08 PROCEDURE — 94060 EVALUATION OF WHEEZING: CPT

## 2025-08-08 PROCEDURE — 94726 PLETHYSMOGRAPHY LUNG VOLUMES: CPT

## 2025-08-08 ASSESSMENT — ENCOUNTER SYMPTOMS
COUGH: 1
WHEEZING: 0
CHEST TIGHTNESS: 0
ABDOMINAL PAIN: 0
SHORTNESS OF BREATH: 1
BACK PAIN: 0

## 2025-08-08 ASSESSMENT — PULMONARY FUNCTION TESTS
FVC_PERCENT_PREDICTED_POST: 46
FVC_PERCENT_PREDICTED_PRE: 39
FEV1_PERCENT_PREDICTED_PRE: 36
FEV1/FVC_PERCENT_PREDICTED_PRE: 91
FEV1_PERCENT_PREDICTED_POST: 41
FEV1/FVC_PERCENT_PREDICTED_POST: 90

## (undated) DEVICE — Z DISCONTINUED (USE MFG CAT MVABO)  TUBING GAS SAMPLING STD 6.5 FT FEMALE CONN SMRT CAPNOLINE

## (undated) DEVICE — Z DISCONTINUED NO SUB IDED TUBING ETCO2 AD L6.5FT NSL ORAL CVD PRNG NONFLARED TIP OVR

## (undated) DEVICE — ENDOSCOPY KIT: Brand: MEDLINE INDUSTRIES, INC.

## (undated) DEVICE — SNARE ENDOSCP W10MMXL240CM SHTH DIA24MM RND INSUL STIFF